# Patient Record
Sex: FEMALE | Race: WHITE | NOT HISPANIC OR LATINO | Employment: OTHER | URBAN - METROPOLITAN AREA
[De-identification: names, ages, dates, MRNs, and addresses within clinical notes are randomized per-mention and may not be internally consistent; named-entity substitution may affect disease eponyms.]

---

## 2018-10-16 ENCOUNTER — IN HOME VISIT (OUTPATIENT)
Dept: GERIATRICS | Age: 83
End: 2018-10-16
Payer: MEDICARE

## 2018-10-16 VITALS
SYSTOLIC BLOOD PRESSURE: 150 MMHG | DIASTOLIC BLOOD PRESSURE: 90 MMHG | HEART RATE: 74 BPM | RESPIRATION RATE: 16 BRPM | TEMPERATURE: 98 F

## 2018-10-16 DIAGNOSIS — E03.9 ACQUIRED HYPOTHYROIDISM: Chronic | ICD-10-CM

## 2018-10-16 DIAGNOSIS — F41.9 ANXIETY: Chronic | ICD-10-CM

## 2018-10-16 DIAGNOSIS — I87.2 CHRONIC VENOUS INSUFFICIENCY: Chronic | ICD-10-CM

## 2018-10-16 DIAGNOSIS — R42 DIZZINESS: Chronic | ICD-10-CM

## 2018-10-16 DIAGNOSIS — K59.09 OTHER CONSTIPATION: Chronic | ICD-10-CM

## 2018-10-16 DIAGNOSIS — K30 INDIGESTION: Chronic | ICD-10-CM

## 2018-10-16 DIAGNOSIS — M19.90 OTHER TYPE OF OSTEOARTHRITIS, UNSPECIFIED SITE: Chronic | ICD-10-CM

## 2018-10-16 DIAGNOSIS — F43.21 SITUATIONAL DEPRESSION: ICD-10-CM

## 2018-10-16 DIAGNOSIS — I10 ESSENTIAL HYPERTENSION: Primary | Chronic | ICD-10-CM

## 2018-10-16 DIAGNOSIS — Z85.3 HISTORY OF MALIGNANT NEOPLASM OF RIGHT BREAST: Chronic | ICD-10-CM

## 2018-10-16 DIAGNOSIS — R74.01 ELEVATED AST (SGOT): Chronic | ICD-10-CM

## 2018-10-16 PROCEDURE — 99326 PR DOMICIL/REST HOME NEW PT HI-MOD SEVER 45 MINUTES: CPT | Performed by: NURSE PRACTITIONER

## 2018-10-16 RX ORDER — ASPIRIN 81 MG/1
81 TABLET, CHEWABLE ORAL DAILY
COMMUNITY
End: 2020-03-16 | Stop reason: HOSPADM

## 2018-10-16 RX ORDER — DIMENHYDRINATE 50 MG/1
25 TABLET ORAL EVERY 6 HOURS PRN
Qty: 30 TABLET | Refills: 3
Start: 2018-10-16 | End: 2020-03-16 | Stop reason: HOSPADM

## 2018-10-16 RX ORDER — CALCIUM CARBONATE 200(500)MG
1 TABLET,CHEWABLE ORAL EVERY 4 HOURS PRN
Qty: 30 TABLET | Refills: 5
Start: 2018-10-16 | End: 2020-03-16 | Stop reason: HOSPADM

## 2018-10-16 RX ORDER — MULTIVITAMIN
1 TABLET ORAL DAILY
COMMUNITY
End: 2018-11-26 | Stop reason: SDUPTHER

## 2018-10-16 RX ORDER — MULTIVIT-MIN/IRON/FOLIC ACID/K 18-600-40
1 CAPSULE ORAL DAILY
COMMUNITY

## 2018-10-16 RX ORDER — ALPRAZOLAM 0.25 MG/1
1 TABLET ORAL DAILY
COMMUNITY
End: 2019-02-11 | Stop reason: SDUPTHER

## 2018-10-16 RX ORDER — LEVOTHYROXINE SODIUM 0.1 MG/1
88 TABLET ORAL DAILY
COMMUNITY

## 2018-10-16 RX ORDER — SENNA AND DOCUSATE SODIUM 50; 8.6 MG/1; MG/1
1 TABLET, FILM COATED ORAL DAILY PRN
Qty: 30 TABLET | Refills: 5
Start: 2018-10-16 | End: 2020-03-16 | Stop reason: HOSPADM

## 2018-10-16 RX ORDER — METOPROLOL TARTRATE 50 MG/1
25 TABLET, FILM COATED ORAL DAILY
COMMUNITY
End: 2020-03-16 | Stop reason: HOSPADM

## 2018-10-16 RX ORDER — ACETAMINOPHEN 500 MG
500 TABLET ORAL EVERY 6 HOURS PRN
Qty: 30 TABLET | Refills: 5
Start: 2018-10-16 | End: 2019-01-08 | Stop reason: SDUPTHER

## 2018-10-16 NOTE — PROGRESS NOTES
Assessment/Plan:    No problem-specific Assessment & Plan notes found for this encounter  Diagnoses and all orders for this visit:    Essential hypertension  Comments:  -metoprolol 50mg QD      Acquired hypothyroidism  Comments:  -levothyroxine 100mcg QD  -Check TSH with reflex T4    Elevated AST (SGOT)  Comments:  -records report this is a mild chronic elevation with normal ALT  -Check CMP    History of malignant neoplasm of right breast  Comments:  -with lumpectomy and radiation approximately 5 years ago  -was getting yrly mammograms but has decided not to have any more mammograms  daughter, Leni Porras, agrees    Chronic venous insufficiency  Comments:  -keeps legs elevated when seated     Anxiety  Comments:  -controlled with laprazolam 0 25mg daily in the morning and a prn dose daily  states she has been taking "for years"    Other type of osteoarthritis, unspecified site  Comments:  -was taking PRN aspirin at recommendation of previous PCP  Will change this to PRN Tylenol  Pt and daughter in agreement  Orders:  -     acetaminophen (TYLENOL) 500 mg tablet; Take 1 tablet (500 mg total) by mouth every 6 (six) hours as needed for mild pain    Dizziness  Comments:  -reports occasional dizziness for which she takes an OTC dramamine  Will order dramamine at pt and daughter request  States it's "been awhile" since dose needed  Orders:  -     dimenhyDRINATE (DRAMAMINE) 50 mg tablet; Take 0 5 tablets (25 mg total) by mouth every 6 (six) hours as needed for movement disorders (dizziness)    Other constipation  Comments:  -daughter requests PRN for this  -Senna S one tab daily PRN  Orders:  -     senna-docusate sodium (SENOKOT-S) 8 6-50 mg per tablet; Take 1 tablet by mouth daily as needed for constipation    Indigestion  Comments:  -daughter requests TUMs for this  -Tums one chew every 4 hours PRN indigestion  Orders:  -     calcium carbonate (TUMS) 500 mg chewable tablet;  Chew 1 tablet (500 mg total) every 4 (four) hours as needed for indigestion or heartburn    Situational depression  Comments:  -related to move to assisted living  -daughter agreeable to hold off on antidepressant at this time  -pt reports no depressive symptoms, is participating social    Other orders  -     ALPRAZolam (XANAX) 0 25 mg tablet; Take 1 tablet by mouth daily And once daily PRN  -     aspirin 81 mg chewable tablet; Chew 81 mg daily  -     Calcium Carb-Cholecalciferol 500-400 MG-UNIT TABS; Take 1 tablet by mouth daily  -     levothyroxine 100 mcg tablet; Take 100 mcg by mouth daily  -     metoprolol tartrate (LOPRESSOR) 50 mg tablet; Take 25 mg by mouth daily  -     Multiple Vitamin (MULTIVITAMIN) tablet; Take 1 tablet by mouth daily          Subjective:      Patient ID: Daniela Downing is a 80 y o  female  80year old female presents for new patient visit  Daughter/Jordon RAPHAEL, present during today's exam   Pt moved to [facility] approximately one week ago  Prior to this she lived at home alone  Daughter states she began assisting with finances approximately one year ago  Daughter states she has become "forgetful"  Pt continued to do her own ADLs  However, for safety reasons, both agree it is time to move to AL facility  Other daughter lives a few blocks down the road  Pt worked as  for [employer] and had 5 children-3 boys, 2 girls  She did not smoke, drink alcohol, no illicit drug use  Had FRANDY 40 years ago  Had a right hip Fx 5 years ago with surgical repair, as a result of a fall  Walks with walker  Has been seeing PT since coming to [facility]  Daughter requesting script for rolling walker with 5 inch wheels  Pt drove up until her hip fx 5 years ago          The following portions of the patient's history were reviewed and updated as appropriate: allergies, current medications, past family history, past medical history, past social history, past surgical history and problem list     Review of Systems   Constitutional: Negative for chills and fever    HENT: Negative for congestion and sore throat  Eyes: Negative for pain  Respiratory: Negative for cough and shortness of breath  Cardiovascular: Positive for palpitations  Negative for chest pain and leg swelling  Occasioanl palpitations during anxious episodes   Gastrointestinal: Negative for abdominal pain, constipation, diarrhea, nausea and vomiting  Genitourinary: Negative for dysuria  Musculoskeletal: Positive for arthralgias  Negative for back pain, joint swelling and myalgias  Skin: Negative for color change  Neurological: Negative for dizziness and headaches  Psychiatric/Behavioral: Negative for behavioral problems and dysphoric mood  The patient is nervous/anxious  Objective:      /90 (BP Location: Right arm, Patient Position: Sitting, Cuff Size: Standard)   Pulse 74   Temp 98 °F (36 7 °C)   Resp 16          Physical Exam   Constitutional: She is oriented to person, place, and time  She is cooperative  No distress  HENT:   Head: Normocephalic and atraumatic  Eyes: Conjunctivae are normal  Right eye exhibits no discharge  Left eye exhibits no discharge  Neck: No JVD present  No tracheal deviation present  No thyromegaly present  Cardiovascular: Normal rate, regular rhythm and normal heart sounds  No murmur heard  Pulmonary/Chest: Effort normal and breath sounds normal  No tachypnea  No respiratory distress  She has no wheezes  She has no rales  She exhibits no tenderness  Abdominal: Soft  Bowel sounds are normal  She exhibits no distension and no mass  There is no tenderness  There is no guarding  Genitourinary:   Genitourinary Comments: No suprapubic tenderness   Musculoskeletal: She exhibits edema  She exhibits no tenderness     1+ pitting edema to B/L ankles and feet, none to legs  Stasis dermatitis to BLEs  Ambulates with roller walker  Mild thoracic scoliosis  Antalgic gait  Upper proximal muscle strength 5/5  Equal hand  strength 5/5  Bunions to B/L medial feet   Lymphadenopathy:     She has no cervical adenopathy  Neurological: She is alert and oriented to person, place, and time  Oriented to place (town) and time (year, month, day)   Skin: Skin is warm and dry  She is not diaphoretic  Psychiatric: She has a normal mood and affect   Her behavior is normal

## 2018-10-29 DIAGNOSIS — E58 CALCIUM DEFICIENCY: Primary | ICD-10-CM

## 2018-10-30 ENCOUNTER — IN HOME VISIT (OUTPATIENT)
Dept: GERIATRICS | Age: 83
End: 2018-10-30
Payer: MEDICARE

## 2018-10-30 VITALS — DIASTOLIC BLOOD PRESSURE: 80 MMHG | HEART RATE: 69 BPM | RESPIRATION RATE: 16 BRPM | SYSTOLIC BLOOD PRESSURE: 150 MMHG

## 2018-10-30 DIAGNOSIS — H11.31 SCLERAL HEMORRHAGE, RIGHT: ICD-10-CM

## 2018-10-30 DIAGNOSIS — F41.9 ANXIETY: Primary | Chronic | ICD-10-CM

## 2018-10-30 DIAGNOSIS — I10 ESSENTIAL HYPERTENSION: Chronic | ICD-10-CM

## 2018-10-30 PROCEDURE — 99335 PR DOM/R-HOME E/M EST PT LW MOD SEVERITY 25 MINUTES: CPT | Performed by: NURSE PRACTITIONER

## 2018-10-30 NOTE — PROGRESS NOTES
Assessment/Plan:    No problem-specific Assessment & Plan notes found for this encounter  Diagnoses and all orders for this visit:    Anxiety  Comments:  -pt denies feeling anxious  -meds reviewed-routine alprazolam 0 25mg daily in the morning and has PRN alprazolam ordered    Scleral hemorrhage, right  Comments:  -report if pain, drainage, or vision loss develop  -f/up next week to re evaluate    Essential hypertension  Comments:  -metoprolol 50mg QD          Subjective:      Patient ID: Alejandro Garner is a 80 y o  female  80year old female being seen for [facility] staff reports of anxiety and physical decline  Pt does have a history of anxiety for which she receives routine alprazolam 0 25mg daily in the AM and also 0 25mg PRN daily  Pt reports feeling no increased anxiety  Upon arrival to room, pt just finishing up with PT [facility] staff, [facility]  Discussed decline with Juanita PT  She states from PT perspective pt is doing quite well  In fact, she is being discharged from PT today because she is doing so well  Pt states she is doing better, physically, than she had been doing before receiving therapy  Pt denies any other concerns  The following portions of the patient's history were reviewed and updated as appropriate: allergies, current medications and problem list     Review of Systems   Constitutional: Negative for chills and fever  HENT: Negative for congestion and sore throat  Eyes: Negative for pain, discharge and visual disturbance  Respiratory: Negative for cough and shortness of breath  Cardiovascular: Negative for chest pain and leg swelling  Gastrointestinal: Negative for abdominal pain, constipation, diarrhea, nausea and vomiting  Genitourinary: Negative for dysuria  Musculoskeletal: Negative for back pain  Skin: Negative for color change  Neurological: Negative for dizziness and headaches  Psychiatric/Behavioral: Negative for behavioral problems   The patient is not nervous/anxious  Objective:      /80 (BP Location: Right arm, Patient Position: Sitting, Cuff Size: Standard)   Pulse 69   Resp 16          Physical Exam   Constitutional: She is cooperative  No distress  HENT:   Head: Normocephalic and atraumatic  Eyes: EOM and lids are normal        Pt denies injury to right eye  Denies pain or discomfort to right eye  Denies vision loss to right eye   Neck: No JVD present  No thyromegaly present  Cardiovascular: Normal rate, regular rhythm and normal heart sounds  Pulmonary/Chest: Effort normal and breath sounds normal  No tachypnea  No respiratory distress  She has no wheezes  She has no rales  She exhibits no tenderness  Musculoskeletal: She exhibits edema  She exhibits no tenderness  Trace pitting edema to B/L feet  Nonpitting edema to b/L ankles   Lymphadenopathy:     She has no cervical adenopathy  Neurological: She is alert  Skin: Skin is warm and dry  She is not diaphoretic  Psychiatric: She has a normal mood and affect   Her behavior is normal

## 2018-11-26 DIAGNOSIS — E63.9 NUTRITION DISORDER: Primary | ICD-10-CM

## 2018-11-26 DIAGNOSIS — E58 CALCIUM DEFICIENCY: ICD-10-CM

## 2018-11-27 RX ORDER — DIPHENOXYLATE HYDROCHLORIDE AND ATROPINE SULFATE 2.5; .025 MG/1; MG/1
1 TABLET ORAL DAILY
Qty: 28 TABLET | Refills: 10 | Status: SHIPPED | OUTPATIENT
Start: 2018-11-27 | End: 2020-03-16 | Stop reason: HOSPADM

## 2019-01-08 DIAGNOSIS — M19.90 OTHER TYPE OF OSTEOARTHRITIS, UNSPECIFIED SITE: Chronic | ICD-10-CM

## 2019-01-10 RX ORDER — ACETAMINOPHEN 500 MG
TABLET ORAL
Qty: 30 TABLET | Refills: 4 | Status: SHIPPED | OUTPATIENT
Start: 2019-01-10 | End: 2020-03-16 | Stop reason: HOSPADM

## 2019-02-05 ENCOUNTER — IN HOME VISIT (OUTPATIENT)
Dept: GERIATRICS | Age: 84
End: 2019-02-05
Payer: MEDICARE

## 2019-02-05 VITALS
DIASTOLIC BLOOD PRESSURE: 82 MMHG | TEMPERATURE: 96.4 F | SYSTOLIC BLOOD PRESSURE: 152 MMHG | HEART RATE: 64 BPM | RESPIRATION RATE: 18 BRPM

## 2019-02-05 DIAGNOSIS — M17.11 OSTEOARTHRITIS OF RIGHT KNEE, UNSPECIFIED OSTEOARTHRITIS TYPE: ICD-10-CM

## 2019-02-05 DIAGNOSIS — I10 ESSENTIAL HYPERTENSION: ICD-10-CM

## 2019-02-05 DIAGNOSIS — F41.9 ANXIETY: Primary | ICD-10-CM

## 2019-02-05 PROCEDURE — 99335 PR DOM/R-HOME E/M EST PT LW MOD SEVERITY 25 MINUTES: CPT | Performed by: NURSE PRACTITIONER

## 2019-02-05 NOTE — PROGRESS NOTES
Assessment/Plan:    No problem-specific Assessment & Plan notes found for this encounter  Diagnoses and all orders for this visit:    Anxiety  Comments:  -no s/s of depression   -continues with alprazolam0 25mg daily in AM and PRN  -attempted to call Liam Garcia at number listed but no answer  Left message     Essential hypertension  Comments:  -BP only slightly elevated today  -continues with metoprolol 50mg daily    Osteoarthritis of right knee, unspecified osteoarthritis type  Comments:  -is working with therapy  -has PRN Tylenol for pain          Subjective:      Patient ID: Travis Walker is a 80 y o  female  80year old female being seen at daughter, Liam Garcia, request because she feels she is depressed  [facility] staff report no change in pt's behaiors or overall  Denies depressive symptoms  Pt denies feeling sad, hopeless, or depressed  Denies decreased appetite or change in sleeping pattern  States "Did my daughter do this?"  Pt reports participating in social activities, including bingo  Also participating in therapy  Spoke with daughter, Liam Garcia, who states mom is doing better than before since she is being encouraged to eat and participate in activities  She is in agreement no med changes at this time and to continue nonpharmacologic interventions of encouraging to participate and eat meals  The following portions of the patient's history were reviewed and updated as appropriate: allergies, current medications and problem list     Review of Systems   Constitutional: Negative for activity change, appetite change and fever  Respiratory: Negative for shortness of breath  Cardiovascular: Negative for chest pain  Musculoskeletal: Positive for arthralgias  Negative for myalgias  Neurological: Negative for headaches  Psychiatric/Behavioral: Negative for agitation, behavioral problems and sleep disturbance  All other systems reviewed and are negative          Objective:      BP 152/82 (BP Location: Right arm, Patient Position: Sitting, Cuff Size: Standard)   Pulse 64   Temp (!) 96 4 °F (35 8 °C)   Resp 18          Physical Exam   Constitutional: She appears well-developed  She is cooperative  No distress  HENT:   Head: Normocephalic and atraumatic  Eyes: Right eye exhibits no discharge  Left eye exhibits no discharge  Neck: No JVD present  No thyromegaly present  Cardiovascular: Normal rate, regular rhythm and normal heart sounds  Pulmonary/Chest: Effort normal and breath sounds normal  No tachypnea  No respiratory distress  She has no wheezes  She has no rales  She exhibits no tenderness  Abdominal: Soft  Bowel sounds are normal  She exhibits no distension  There is no tenderness  Musculoskeletal: She exhibits no edema or tenderness  Ambulates independently with walker   Lymphadenopathy:     She has no cervical adenopathy  Neurological: She is alert  Oriented to place [place]) but not time   Skin: Skin is warm and dry  She is not diaphoretic  Psychiatric: She has a normal mood and affect   Her behavior is normal

## 2019-02-11 DIAGNOSIS — F41.9 ANXIETY: Primary | ICD-10-CM

## 2019-02-13 RX ORDER — ALPRAZOLAM 0.25 MG/1
TABLET ORAL
Qty: 28 TABLET | Refills: 4 | Status: SHIPPED | OUTPATIENT
Start: 2019-02-13 | End: 2020-03-16 | Stop reason: HOSPADM

## 2019-10-31 ENCOUNTER — HOSPITAL ENCOUNTER (EMERGENCY)
Facility: HOSPITAL | Age: 84
Discharge: NURSING, REHAB OR LONG-TERM CARE | End: 2019-10-31
Admitting: EMERGENCY MEDICINE
Payer: MEDICARE

## 2019-10-31 ENCOUNTER — APPOINTMENT (EMERGENCY)
Dept: RADIOLOGY | Facility: HOSPITAL | Age: 84
End: 2019-10-31
Payer: MEDICARE

## 2019-10-31 VITALS
RESPIRATION RATE: 16 BRPM | DIASTOLIC BLOOD PRESSURE: 88 MMHG | HEART RATE: 66 BPM | WEIGHT: 147.93 LBS | SYSTOLIC BLOOD PRESSURE: 197 MMHG | OXYGEN SATURATION: 95 % | TEMPERATURE: 97.6 F
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
## 2019-10-31 PROBLEM — S81.811A SKIN TEAR OF LOWER LEG WITHOUT COMPLICATION, RIGHT, INITIAL ENCOUNTER: Status: ACTIVE | Noted: 2019-10-31

## 2019-10-31 LAB
BASE EXCESS BLDA CALC-SCNC: 3 MMOL/L (ref -2–3)
CA-I BLD-SCNC: 1.22 MMOL/L (ref 1.12–1.32)
GLUCOSE SERPL-MCNC: 88 MG/DL (ref 65–140)
HCO3 BLDA-SCNC: 27.5 MMOL/L (ref 24–30)
HCT VFR BLD CALC: 38 % (ref 34.8–46.1)
HGB BLDA-MCNC: 12.9 G/DL (ref 11.5–15.4)
HOLD SPECIMEN: NORMAL
PCO2 BLD: 29 MMOL/L (ref 21–32)
PCO2 BLD: 41.1 MM HG (ref 42–50)
PH BLD: 7.43 [PH] (ref 7.3–7.4)
PO2 BLD: 34 MM HG (ref 35–45)
POTASSIUM BLD-SCNC: 3.5 MMOL/L (ref 3.5–5.3)
SAO2 % BLD FROM PO2: 66 % (ref 60–85)
SODIUM BLD-SCNC: 142 MMOL/L (ref 136–145)
SPECIMEN SOURCE: ABNORMAL

## 2019-10-31 PROCEDURE — 99285 EMERGENCY DEPT VISIT HI MDM: CPT

## 2019-10-31 PROCEDURE — 99282 EMERGENCY DEPT VISIT SF MDM: CPT | Performed by: SURGERY

## 2019-10-31 PROCEDURE — 90715 TDAP VACCINE 7 YRS/> IM: CPT | Performed by: EMERGENCY MEDICINE

## 2019-10-31 PROCEDURE — 84295 ASSAY OF SERUM SODIUM: CPT

## 2019-10-31 PROCEDURE — 72125 CT NECK SPINE W/O DYE: CPT

## 2019-10-31 PROCEDURE — 82803 BLOOD GASES ANY COMBINATION: CPT

## 2019-10-31 PROCEDURE — 82330 ASSAY OF CALCIUM: CPT

## 2019-10-31 PROCEDURE — 70450 CT HEAD/BRAIN W/O DYE: CPT

## 2019-10-31 PROCEDURE — 82947 ASSAY GLUCOSE BLOOD QUANT: CPT

## 2019-10-31 PROCEDURE — 90471 IMMUNIZATION ADMIN: CPT

## 2019-10-31 PROCEDURE — NC001 PR NO CHARGE: Performed by: EMERGENCY MEDICINE

## 2019-10-31 PROCEDURE — 36415 COLL VENOUS BLD VENIPUNCTURE: CPT | Performed by: EMERGENCY MEDICINE

## 2019-10-31 PROCEDURE — 85014 HEMATOCRIT: CPT

## 2019-10-31 PROCEDURE — 84132 ASSAY OF SERUM POTASSIUM: CPT

## 2019-10-31 PROCEDURE — 71045 X-RAY EXAM CHEST 1 VIEW: CPT

## 2019-10-31 RX ORDER — LACTOSE-REDUCED FOOD
1 LIQUID (ML) ORAL DAILY
COMMUNITY

## 2019-10-31 RX ORDER — METOPROLOL TARTRATE 50 MG/1
50 TABLET, FILM COATED ORAL DAILY
COMMUNITY

## 2019-10-31 RX ORDER — ALPRAZOLAM 0.25 MG/1
0.25 TABLET ORAL DAILY PRN
COMMUNITY
End: 2020-03-16 | Stop reason: HOSPADM

## 2019-10-31 RX ORDER — ACETAMINOPHEN 500 MG
500 TABLET ORAL 3 TIMES DAILY
COMMUNITY

## 2019-10-31 RX ORDER — DIPHENOXYLATE HYDROCHLORIDE AND ATROPINE SULFATE 2.5; .025 MG/1; MG/1
1 TABLET ORAL DAILY
COMMUNITY
End: 2020-03-16 | Stop reason: HOSPADM

## 2019-10-31 RX ORDER — ASPIRIN 81 MG/1
81 TABLET, CHEWABLE ORAL DAILY
COMMUNITY
End: 2020-03-16 | Stop reason: HOSPADM

## 2019-10-31 RX ORDER — LEVOTHYROXINE SODIUM 0.1 MG/1
137 TABLET ORAL DAILY
COMMUNITY
End: 2020-03-16 | Stop reason: HOSPADM

## 2019-10-31 RX ORDER — CALCIUM CARBONATE 200(500)MG
1 TABLET,CHEWABLE ORAL EVERY 4 HOURS PRN
COMMUNITY
End: 2020-03-16 | Stop reason: HOSPADM

## 2019-10-31 RX ORDER — ACETAMINOPHEN 500 MG
500 TABLET ORAL EVERY 6 HOURS PRN
COMMUNITY

## 2019-10-31 RX ORDER — SERTRALINE HYDROCHLORIDE 100 MG/1
50 TABLET, FILM COATED ORAL DAILY
COMMUNITY
End: 2020-03-16 | Stop reason: HOSPADM

## 2019-10-31 RX ORDER — ALPRAZOLAM 0.25 MG/1
0.25 TABLET ORAL DAILY
Status: ON HOLD | COMMUNITY
End: 2020-03-16 | Stop reason: SDUPTHER

## 2019-10-31 RX ADMIN — TETANUS TOXOID, REDUCED DIPHTHERIA TOXOID AND ACELLULAR PERTUSSIS VACCINE, ADSORBED 0.5 ML: 5; 2.5; 8; 8; 2.5 SUSPENSION INTRAMUSCULAR at 05:22

## 2019-10-31 NOTE — DISCHARGE INSTRUCTIONS
Fall Prevention for Older Adults   WHAT YOU NEED TO KNOW:   As you age, your muscles weaken and your risk for falls increases  Your risk also increases if you take medicines that make you sleepy or dizzy  You may also be at risk if you have vision or joint problems, have low blood pressure, or are not active  DISCHARGE INSTRUCTIONS:   Call 911 or have someone else call if:   · You have fallen and are unconscious  · You have fallen and cannot move part of your body  Contact your healthcare provider if:   · You have fallen and have pain or a headache  · You have questions or concerns about your condition or care  Fall prevention tips:   · Stay active  Exercise can help strengthen your muscles and improve your balance  Your healthcare provider may recommend water aerobics, walking, or Rodrick Chi  He may also recommend physical therapy to improve your coordination  Never start an exercise program without asking your healthcare provider first     · Wear shoes that fit well and have soles that   Wear shoes both inside and outside  Use slippers with good   Avoid shoes with high heels  · Use assistive devices as directed  Your healthcare provider may suggest that you use a cane or walker to help you keep your balance  You may need to have grab bars put in your bathroom near the toilet or in the shower  · Stand or sit up slowly  This may help you keep your balance and prevent falls  · Wear a personal alarm  This is a device that allows you to call 911 if you need help  Ask for more information on personal alarms  · Manage your medical conditions  Keep all appointments with your healthcare providers  Visit your eye doctor as directed  Home safety tips:   · Add items to prevent falls in the bathroom  Put nonslip strips on your bath or shower floor to prevent you from slipping  Use a bath mat if you do not have carpet in the bathroom   This will prevent you from falling when you step out of the bath or shower  Use a shower seat so you do not need to stand while you shower  Sit on the toilet or a chair in your bathroom to dry yourself and put on clothing  This will prevent you from losing your balance from drying or dressing yourself while you are standing  · Keep paths clear  Remove books, shoes, and other objects from walkways and stairs  Place cords for telephones and lamps out of the way so that you do not need to walk over them  Tape them down if you cannot move them  Remove small rugs  If you cannot remove a rug, secure it with double-sided tape  This will prevent you from tripping  · Install bright lights in your home  Use night lights to help light paths to the bathroom or kitchen  Always turn on the light before you start walking  · Keep items you use often on shelves within reach  Do not use a step stool to help you reach an item  · Paint or place reflective tape on the edges of your stairs  This will help you see the stairs better  Follow up with your healthcare provider as directed:  Write down your questions so you remember to ask them during your visits  ©  [address] [employer] [employer] Information is for End User's use only and may not be sold, redistributed or otherwise used for commercial purposes  All illustrations and images included in CareNotes® are the copyrighted property of [employer]  or [address]  The above information is an  only  It is not intended as medical advice for individual conditions or treatments  Talk to your doctor, nurse or pharmacist before following any medical regimen to see if it is safe and effective for you  Skin Tear   WHAT YOU NEED TO KNOW:   A skin tear occurs when the layers of weakened skin split open from an injury  , elderly, and chronically or critically ill people are at higher risk for skin tears  Long-term use of steroids can also increase the risk   It is important to treat and prevent skin tears to prevent infection  DISCHARGE INSTRUCTIONS:   Medicines:   · Medicines  may be given to reduce your pain or to treat or prevent an infection  Do not wait until the pain is severe before you ask for more medicine  · Take your medicine as directed  Contact your healthcare provider if you think your medicine is not helping or if you have side effects  Tell him of her if you are allergic to any medicine  Keep a list of the medicines, vitamins, and herbs you take  Include the amounts, and when and why you take them  Bring the list or the pill bottles to follow-up visits  Carry your medicine list with you in case of an emergency  Follow up with your healthcare provider as directed:  Write down your questions so you remember to ask them during your visits  Wound care: You may be referred to a wound specialist who will teach you how to clean your wound properly  Ask for more information about wound care  Prevent another skin tear:   · Clean, moisturize, and protect your skin  Baths, hot showers, and soap can dry your skin and increase your risk for skin tears  Take tepid showers, use mild soap as directed, and gently pat your skin dry  Use lotion to keep your skin moist after you shower  Wear long sleeves, pants, and protective footwear  · Move carefully  Ask for help if you cannot lift yourself well enough to avoid dragging your skin when you move  · Keep your home safe  Cover sharp corners, keep your pathways clear, and turn on lights so you can see clearly  Ask for more information if you have questions about home safety  · Drink liquids as directed  Ask your healthcare provider how much liquid to drink each day and which liquids are best for you  Liquids will help keep your skin moist and protected from future skin tears  · Eat high-protein foods  Examples are lean meats, fish, low-fat dairy products, and beans   A dietitian may help you create high-protein meal plans to help with wound healing  Contact your healthcare provider if:   · You have redness, swelling, pus, or a bad odor coming from your wound  · Blood soaks through your bandage  · You have increased pain, even after you take pain medicine  · Your wound tears open again  Return to the emergency department if:   · You have a fever  © 2017 [address] Information is for End User's use only and may not be sold, redistributed or otherwise used for commercial purposes  All illustrations and images included in CareNotes® are the copyrighted property of [employer]  or [address]  The above information is an  only  It is not intended as medical advice for individual conditions or treatments  Talk to your doctor, nurse or pharmacist before following any medical regimen to see if it is safe and effective for you

## 2019-10-31 NOTE — PROGRESS NOTES
Pastoral Care Progress Note    10/31/2019  Patient: Callie Garner : 1930  Admission Date & Time: 10/31/2019 0457  MRN: [id number] CSN: [id number]                     Chaplaincy Interventions Utilized:   Empowerment: Normalized experience of patient/family    Exploration: Explored emotional needs & resources        Relationship Building: Listened empathically            Chaplaincy Outcomes Achieved:  Debriefed/defused experience    Spoke with family on the phone  They are on their way

## 2019-10-31 NOTE — ED PROVIDER NOTES
Emergency Department Airway Evaluation and Management Form    History  Obtained from: EMS  [place]  Chief Complaint   Patient presents with    Fall     Patient presents to ED after unwittnessed fall at [facility] secured dementia unit  Patient found lying on floor by staff  Laceration to right calf, bleeding controlled with bandage  HPI    Past Medical History:   Diagnosis Date    Depression     Hypertension     Hypothyroidism     Malignant neoplasm of right breast ([address] )      History reviewed  No pertinent surgical history  History reviewed  No pertinent family history  Social History     Tobacco Use    Smoking status: Unknown If Ever Smoked   Substance Use Topics    Alcohol use: Not Currently    Drug use: Never     I have reviewed and agree with the history as documented  Review of Systems    Physical Exam  BP (!) 200/96   Pulse 68   Temp 97 6 °F (36 4 °C) (Tympanic)   Resp 16   Wt 67 1 kg (147 lb 14 9 oz)   SpO2 94%     Physical Exam    ED Medications  Medications   tetanus-diphtheria-acellular pertussis (BOOSTRIX) IM injection 0 5 mL (has no administration in time range)       Intubation  Procedures    Notes  Airway intact  No acute intervention necessary at this time      Final Diagnosis  Final diagnoses:   None       ED Provider  Electronically Signed by     Marly Mayers DO  10/31/19 5743

## 2019-10-31 NOTE — H&P
H&P Exam - Trauma   Callie Garner 80 y o  female MRN: [id number]  Unit/Bed#: ED 03 Encounter: [id number]    Assessment/Plan   Trauma Alert: Level B  Model of Arrival: Ambulance  Trauma Team: Attending Shauna Olguin, , Residents Ernesto Guerrero and Fellow Marc  Consultants: None    Trauma Active Problems: Fall on ASA    Trauma Plan: CT head/C-spine     Chief Complaint: left leg pain    History of Present Illness   HPI:  Callie Garner is a 80 y o  female with a past medical history of dementia, hypertension, on 81 mg aspirin daily who presents to trauma bay after unwitnessed fall at her locked dementia unit at [facility]  Patient is complaining of pain over a right lower extremity skin tear  She is not able to relate the details of the event  Mechanism:Fall    Review of Systems   Unable to perform ROS: Dementia       Historical Information   History is unobtainable from the patient due to dementia  Efforts to obtain history included the following sources: other medical personnel    Past Medical History:   Diagnosis Date    Depression     Hypertension     Hypothyroidism     Malignant neoplasm of right breast ([address] )      History reviewed  No pertinent surgical history    Social History   Social History     Substance and Sexual Activity   Alcohol Use Not Currently     Social History     Substance and Sexual Activity   Drug Use Never     Social History     Tobacco Use   Smoking Status Unknown If Ever Smoked     Immunization History   Administered Date(s) Administered    Tdap 10/31/2019     Last Tetanus: 1990  Family History: Non-contributory  Unable to obtain/limited by dementia      Meds/Allergies   all current active meds have been reviewed    Allergies   Allergen Reactions    Sulindac          PHYSICAL EXAM      Objective   Vitals:   First set: Temperature: 97 6 °F (36 4 °C) (10/31/19 0500)  Pulse: 71 (10/31/19 0500)  Respirations: 16 (10/31/19 0500)  Blood Pressure: (!) 211/111 (10/31/19 0500)    Primary Survey:   (A) Airway: intact  (B) Breathing: bilateral breath sounds  (C) Circulation: Pulses:   carotid  2/4, pedal  2/4 and femoral  2/4  (D) Disabliity:  GCS Total:  15  (E) Expose:  Completed    Secondary Survey:  Physical Exam   Constitutional: She is oriented to person, place, and time  She appears well-developed and well-nourished  HENT:   Head: Normocephalic and atraumatic  Right Ear: Tympanic membrane normal  No hemotympanum  Left Ear: Tympanic membrane normal  No hemotympanum  Nose: Nose normal  No nasal septal hematoma  Mouth/Throat: Uvula is midline and oropharynx is clear and moist    Eyes: Pupils are equal, round, and reactive to light  Conjunctivae and EOM are normal    Neck: Phonation normal  No spinous process tenderness present  Cardiovascular: Normal rate, regular rhythm, normal heart sounds and intact distal pulses  No murmur heard  Pulses:       Carotid pulses are 2+ on the right side, and 2+ on the left side  Femoral pulses are 2+ on the right side, and 2+ on the left side  Dorsalis pedis pulses are 2+ on the right side, and 2+ on the left side  Pulmonary/Chest: Effort normal and breath sounds normal  She exhibits no tenderness and no crepitus  Abdominal: Soft  Bowel sounds are normal  There is no tenderness  There is no rigidity  Musculoskeletal: Normal range of motion  Right shoulder: Normal         Left shoulder: Normal         Right elbow: Normal        Left elbow: Normal         Right wrist: Normal         Left wrist: Normal         Right hip: Normal         Left hip: Normal         Right knee: Normal         Left knee: Normal         Right ankle: Normal         Left ankle: Normal         Cervical back: Normal         Thoracic back: Normal         Lumbar back: Normal    Neurological: She is alert and oriented to person, place, and time  She has normal strength  No cranial nerve deficit or sensory deficit  GCS eye subscore is 4   GCS verbal subscore is 5  GCS motor subscore is 6  Patient is alert and oriented to person, place, and day  Speech is normal with no dysarthria, no aphasia  Cranial nerves II-XII intact  Sensation is intact bilaterally upper and lower extremities  Normal muscle tone, no clonus, no atrophy  5/5 muscle strength bilaterally upper extremities, 5/5 muscle strength bilaterally lower extremities  Resting tremor all four extremities    Skin: Skin is warm and dry  She is not diaphoretic  Psychiatric: She has a normal mood and affect  Her behavior is normal    Vitals reviewed  Invasive Devices     None                 Lab Results: Results: I have personally reviewed pertinent reports  Imaging/EKG Studies: Results: I have personally reviewed pertinent reports      Other Studies:     Code Status: No Order  Advance Directive and Living Will:      Power of :    POLST:

## 2019-11-16 ENCOUNTER — APPOINTMENT (EMERGENCY)
Dept: RADIOLOGY | Facility: HOSPITAL | Age: 84
End: 2019-11-16
Payer: MEDICARE

## 2019-11-16 ENCOUNTER — HOSPITAL ENCOUNTER (EMERGENCY)
Facility: HOSPITAL | Age: 84
Discharge: HOME | End: 2019-11-16
Attending: EMERGENCY MEDICINE
Payer: MEDICARE

## 2019-11-16 ENCOUNTER — APPOINTMENT (EMERGENCY)
Dept: NON INVASIVE DIAGNOSTICS | Facility: HOSPITAL | Age: 84
End: 2019-11-16
Payer: MEDICARE

## 2019-11-16 VITALS
WEIGHT: 143.74 LBS | SYSTOLIC BLOOD PRESSURE: 194 MMHG | BODY MASS INDEX: 22.56 KG/M2 | HEART RATE: 67 BPM | TEMPERATURE: 98 F | OXYGEN SATURATION: 97 % | HEIGHT: 67 IN | DIASTOLIC BLOOD PRESSURE: 99 MMHG | RESPIRATION RATE: 20 BRPM

## 2019-11-16 DIAGNOSIS — L03.90 CELLULITIS: Primary | ICD-10-CM

## 2019-11-16 LAB
ALBUMIN SERPL BCP-MCNC: 3.8 G/DL (ref 3.5–5)
ALP SERPL-CCNC: 86 U/L (ref 46–116)
ALT SERPL W P-5'-P-CCNC: 27 U/L (ref 12–78)
ANION GAP SERPL CALCULATED.3IONS-SCNC: 9 MMOL/L (ref 4–13)
AST SERPL W P-5'-P-CCNC: 50 U/L (ref 5–45)
BASOPHILS # BLD AUTO: 0.03 THOUSANDS/ΜL (ref 0–0.1)
BASOPHILS NFR BLD AUTO: 0 % (ref 0–1)
BILIRUB SERPL-MCNC: 0.33 MG/DL (ref 0.2–1)
BUN SERPL-MCNC: 18 MG/DL (ref 5–25)
CALCIUM SERPL-MCNC: 9.2 MG/DL (ref 8.3–10.1)
CHLORIDE SERPL-SCNC: 106 MMOL/L (ref 100–108)
CO2 SERPL-SCNC: 28 MMOL/L (ref 21–32)
CREAT SERPL-MCNC: 0.67 MG/DL (ref 0.6–1.3)
EOSINOPHIL # BLD AUTO: 0.18 THOUSAND/ΜL (ref 0–0.61)
EOSINOPHIL NFR BLD AUTO: 3 % (ref 0–6)
ERYTHROCYTE [DISTWIDTH] IN BLOOD BY AUTOMATED COUNT: 12.2 % (ref 11.6–15.1)
GFR SERPL CREATININE-BSD FRML MDRD: 78 ML/MIN/1.73SQ M
GLUCOSE SERPL-MCNC: 91 MG/DL (ref 65–140)
HCT VFR BLD AUTO: 40.5 % (ref 34.8–46.1)
HGB BLD-MCNC: 13.3 G/DL (ref 11.5–15.4)
IMM GRANULOCYTES # BLD AUTO: 0.02 THOUSAND/UL (ref 0–0.2)
IMM GRANULOCYTES NFR BLD AUTO: 0 % (ref 0–2)
LYMPHOCYTES # BLD AUTO: 1.57 THOUSANDS/ΜL (ref 0.6–4.47)
LYMPHOCYTES NFR BLD AUTO: 22 % (ref 14–44)
MCH RBC QN AUTO: 32.4 PG (ref 26.8–34.3)
MCHC RBC AUTO-ENTMCNC: 32.8 G/DL (ref 31.4–37.4)
MCV RBC AUTO: 99 FL (ref 82–98)
MONOCYTES # BLD AUTO: 0.67 THOUSAND/ΜL (ref 0.17–1.22)
MONOCYTES NFR BLD AUTO: 10 % (ref 4–12)
NEUTROPHILS # BLD AUTO: 4.6 THOUSANDS/ΜL (ref 1.85–7.62)
NEUTS SEG NFR BLD AUTO: 65 % (ref 43–75)
NRBC BLD AUTO-RTO: 0 /100 WBCS
PLATELET # BLD AUTO: 234 THOUSANDS/UL (ref 149–390)
PMV BLD AUTO: 9.7 FL (ref 8.9–12.7)
POTASSIUM SERPL-SCNC: 3.7 MMOL/L (ref 3.5–5.3)
PROT SERPL-MCNC: 7.1 G/DL (ref 6.4–8.2)
RBC # BLD AUTO: 4.1 MILLION/UL (ref 3.81–5.12)
SODIUM SERPL-SCNC: 143 MMOL/L (ref 136–145)
WBC # BLD AUTO: 7.07 THOUSAND/UL (ref 4.31–10.16)

## 2019-11-16 PROCEDURE — 93971 EXTREMITY STUDY: CPT | Performed by: SURGERY

## 2019-11-16 PROCEDURE — 80053 COMPREHEN METABOLIC PANEL: CPT | Performed by: EMERGENCY MEDICINE

## 2019-11-16 PROCEDURE — 99284 EMERGENCY DEPT VISIT MOD MDM: CPT | Performed by: EMERGENCY MEDICINE

## 2019-11-16 PROCEDURE — 99284 EMERGENCY DEPT VISIT MOD MDM: CPT

## 2019-11-16 PROCEDURE — 93971 EXTREMITY STUDY: CPT

## 2019-11-16 PROCEDURE — 93005 ELECTROCARDIOGRAM TRACING: CPT

## 2019-11-16 PROCEDURE — 85025 COMPLETE CBC W/AUTO DIFF WBC: CPT | Performed by: EMERGENCY MEDICINE

## 2019-11-16 PROCEDURE — 36415 COLL VENOUS BLD VENIPUNCTURE: CPT | Performed by: EMERGENCY MEDICINE

## 2019-11-16 PROCEDURE — 73630 X-RAY EXAM OF FOOT: CPT

## 2019-11-16 RX ORDER — DOXYCYCLINE HYCLATE 100 MG/1
100 CAPSULE ORAL 2 TIMES DAILY
Qty: 14 CAPSULE | Refills: 0 | Status: SHIPPED | OUTPATIENT
Start: 2019-11-16 | End: 2019-11-23

## 2019-11-16 RX ORDER — DOXYCYCLINE HYCLATE 100 MG/1
100 CAPSULE ORAL ONCE
Status: COMPLETED | OUTPATIENT
Start: 2019-11-16 | End: 2019-11-16

## 2019-11-16 RX ADMIN — DOXYCYCLINE 100 MG: 100 CAPSULE ORAL at 13:42

## 2019-11-16 NOTE — ED NOTES
Pt  Transported to [facility]  Report called [facility]        Kina Moreno RN  11/16/19 9052

## 2019-11-16 NOTE — ED PROVIDER NOTES
History  Chief Complaint   Patient presents with    Leg Swelling     Pt  arrives from [facility] with right lower extremity edema since this morning  Patient is a 77-year-old woman presents for [facility] dementia unit with complaints of right lower extremity swelling  Approximately 2 weeks ago, the patient had a fall and suffered abrasion to the right calf  She presented to this emergency department with a negative workup and was discharged  Nursing staff noted this morning that the patient had right lower leg erythema, swelling, tenderness, pain, warmth  Patient is not currently complaining of any pain at this time  She has not taken anything for the pain  No known trauma to the area  No known prior history of DVT/PE  Patient is on 81 mg aspirin but no anticoagulants  No known history of fevers, chills, rigors  She has been eating and drinking normally over the past several days  No history of nausea, vomiting  She denies any chest pain, dyspnea, abdominal pain  She has been moving her bowels and urinating normally  Patient is primarily wheelchair bound and does not walk  Prior to Admission Medications   Prescriptions Last Dose Informant Patient Reported? Taking? ALPRAZolam (XANAX) 0 25 mg tablet 11/16/2019 at Unknown time  No Yes   Sig: TAKE 1 TABLET BY MOUTH DAILY  ALPRAZolam (XANAX) 0 25 mg tablet   Yes No   Sig: Take 0 25 mg by mouth daily   ALPRAZolam (XANAX) 0 25 mg tablet   Yes No   Sig: Take 0 25 mg by mouth daily as needed for anxiety   Calcium Carb-Cholecalciferol 500-400 MG-UNIT TABS 11/16/2019 at Unknown time  Yes Yes   Sig: Take 1 tablet by mouth daily   Calcium Carbonate-Vitamin D 600-400 MG-UNIT per tablet   No No   Sig: TAKE 1 TABLET BY MOUTH ONCE DAILY     Calcium-Vitamin D ([employer] CALCIUM-600/VIT D PO)   Yes No   Sig: Take 1 tablet by mouth daily   MAPAP 500 MG tablet Past Week at Unknown time  No Yes   Sig: TAKE 1 TABLET BY MOUTH EVERY 6 HOURS AS NEEDED Meclizine HCl (MEDI-MECLIZINE PO) Past Week at Unknown time  Yes Yes   Sig: Take 25 mg by mouth every 6 (six) hours as needed   Menthol, Topical Analgesic, (BIOFREEZE) 4 % GEL Past Week at Unknown time  Yes Yes   Sig: Apply topically daily   Nutritional Supplements (ENSURE) 11/15/2019 at Unknown time  Yes Yes   Sig: Take 1 Can by mouth daily   Sennosides-Docusate Sodium (SENNALAX-S PO)   Yes No   Sig: Take 1 tablet by mouth daily as needed   acetaminophen (TYLENOL) 500 mg tablet Past Week at Unknown time  Yes Yes   Sig: Take 500 mg by mouth 3 (three) times a day   acetaminophen (TYLENOL) 500 mg tablet Past Week at Unknown time  Yes Yes   Sig: Take 500 mg by mouth every 6 (six) hours as needed for mild pain   aspirin 81 mg chewable tablet 11/16/2019 at Unknown time  Yes Yes   Sig: Chew 81 mg daily   aspirin 81 mg chewable tablet   Yes No   Sig: Chew 81 mg daily   calcium carbonate (TUMS) 500 mg chewable tablet   No No   Sig: Chew 1 tablet (500 mg total) every 4 (four) hours as needed for indigestion or heartburn   calcium carbonate (TUMS) 500 mg chewable tablet Past Week at Unknown time  Yes Yes   Sig: Chew 1 tablet every 4 (four) hours as needed for indigestion or heartburn   dimenhyDRINATE (DRAMAMINE) 50 mg tablet   No No   Sig: Take 0 5 tablets (25 mg total) by mouth every 6 (six) hours as needed for movement disorders (dizziness)   levothyroxine 100 mcg tablet 11/16/2019 at Unknown time  Yes Yes   Sig: Take 137 mcg by mouth daily    levothyroxine 100 mcg tablet   Yes No   Sig: Take 137 mcg by mouth daily   metoprolol tartrate (LOPRESSOR) 50 mg tablet 11/16/2019 at Unknown time  Yes Yes   Sig: Take 25 mg by mouth daily   metoprolol tartrate (LOPRESSOR) 50 mg tablet   Yes No   Sig: Take 50 mg by mouth daily   multivitamin (THERAGRAN) TABS 11/16/2019 at Unknown time  No Yes   Sig: TAKE 1 TABLET BY MOUTH DAILY     multivitamin (THERAGRAN) TABS   Yes No   Sig: Take 1 tablet by mouth daily   senna-docusate sodium (SENOKOT-S) 8 6-50 mg per tablet Past Week at Unknown time  No Yes   Sig: Take 1 tablet by mouth daily as needed for constipation   sertraline (ZOLOFT) 100 mg tablet 11/15/2019 at Unknown time  Yes Yes   Sig: Take 50 mg by mouth daily      Facility-Administered Medications: None       Past Medical History:   Diagnosis Date    Anxiety     Benign positional vertigo     Breast cancer, right (HCC)     Dr Kody CHERRY    Chronic venous insufficiency     Depression     [facility]    HTN (hypertension), benign     Hypertension     Hypothyroidism     Left knee DJD     Mal[facility] ne[facility] of right [facility] )     Nontoxic uninodular goiter     Right knee DJD        Past Surgical History:   Procedure Laterality Date    INTERTROCHANTERIC HIP FRACTURE SURGERY  2013       History reviewed  No pertinent family history  I have reviewed and agree with the history as documented  Social History     Tobacco Use    Smoking status: Never Smoker    Smokeless tobacco: Never Used   Substance Use Topics    Alcohol use: Not Currently    Drug use: Never        Review of Systems   Constitutional: Negative for chills, diaphoresis, fatigue and fever  HENT: Negative for facial swelling, sore throat and trouble swallowing  Respiratory: Negative for cough, chest tightness, shortness of breath and wheezing  Cardiovascular: Positive for leg swelling  Negative for chest pain  Gastrointestinal: Negative for abdominal distention, abdominal pain, diarrhea, nausea and vomiting  Genitourinary: Negative for dysuria  Musculoskeletal: Negative for back pain, neck pain and neck stiffness  Skin: Negative for color change, pallor, rash and wound  Neurological: Negative for weakness, light-headedness and numbness  Psychiatric/Behavioral: Negative for agitation  All other systems reviewed and are negative        Physical Exam  ED Triage Vitals   Temperature Pulse Respirations Blood Pressure SpO2   11/16/19 1117 11/16/19 1117 11/16/19 1113 11/16/19 1117 11/16/19 1117   98 °F (36 7 °C) 72 18 (!) 197/100 95 %      Temp Source Heart Rate Source Patient Position - Orthostatic VS BP Location FiO2 (%)   11/16/19 1113 11/16/19 1113 11/16/19 1113 11/16/19 1113 --   Oral Monitor Lying Right arm       Pain Score       11/16/19 1113       No Pain             Orthostatic Vital Signs  Vitals:    11/16/19 1232 11/16/19 1245 11/16/19 1300 11/16/19 1348   BP: (!) 171/91 (!) 171/91 (!) 171/94 (!) 194/99   Pulse: 67 64 62 67   Patient Position - Orthostatic VS: Lying Lying Lying Lying       Physical Exam   Constitutional: She is oriented to person, place, and time  She appears well-developed and well-nourished  No distress  HENT:   Head: Normocephalic  Eyes: Pupils are equal, round, and reactive to light  Neck: Normal range of motion  Neck supple  Cardiovascular: Normal rate, regular rhythm, normal heart sounds and intact distal pulses  Pulmonary/Chest: Effort normal and breath sounds normal    Abdominal: Soft  Bowel sounds are normal  She exhibits no distension  There is no tenderness  There is no guarding  Musculoskeletal: Normal range of motion  She exhibits edema and tenderness  She exhibits no deformity  Right lower extremity:  Patient with erythema/tenderness/swelling/warmth over the right forefoot and right lower calf  No bony tenderness of the ankle, knee, hip noted  Full range of motion of these joints  Neurological: She is alert and oriented to person, place, and time  No cranial nerve deficit or sensory deficit  Skin: Skin is warm and dry  Capillary refill takes less than 2 seconds  Psychiatric: She has a normal mood and affect  Her behavior is normal  Judgment and thought content normal    Vitals reviewed        ED Medications  Medications   doxycycline hyclate (VIBRAMYCIN) capsule 100 mg (100 mg Oral Given 11/16/19 1342)       Diagnostic Studies  Results Reviewed     Procedure Component Value Units Date/Time    Comprehensive metabolic panel [[id number]]  (Abnormal) Collected:  11/16/19 1219    Lab Status:  Final result Specimen:  Blood from Arm, Left Updated:  11/16/19 1300     Sodium 143 mmol/L      Potassium 3 7 mmol/L      Chloride 106 mmol/L      CO2 28 mmol/L      ANION GAP 9 mmol/L      BUN 18 mg/dL      Creatinine 0 67 mg/dL      Glucose 91 mg/dL      Calcium 9 2 mg/dL      AST 50 U/L      ALT 27 U/L      Alkaline Phosphatase 86 U/L      Total Protein 7 1 g/dL      Albumin 3 8 g/dL      Total Bilirubin 0 33 mg/dL      eGFR 78 ml/min/1 73sq m     Narrative:       [place] guidelines for Chronic Kidney Disease (CKD):     Stage 1 with normal or high GFR (GFR > 90 mL/min/1 73 square meters)    Stage 2 Mild CKD (GFR = 60-89 mL/min/1 73 square meters)    Stage 3A Moderate CKD (GFR = 45-59 mL/min/1 73 square meters)    Stage 3B Moderate CKD (GFR = 30-44 mL/min/1 73 square meters)    Stage 4 Severe CKD (GFR = 15-29 mL/min/1 73 square meters)    Stage 5 End Stage CKD (GFR <15 mL/min/1 73 square meters)  Note: GFR calculation is accurate only with a steady state creatinine    CBC and differential [[id number]]  (Abnormal) Collected:  11/16/19 1219    Lab Status:  Final result Specimen:  Blood from Arm, Left Updated:  11/16/19 1231     WBC 7 07 Thousand/uL      RBC 4 10 Million/uL      Hemoglobin 13 3 g/dL      Hematocrit 40 5 %      MCV 99 fL      MCH 32 4 pg      MCHC 32 8 g/dL      RDW 12 2 %      MPV 9 7 fL      Platelets 769 Thousands/uL      nRBC 0 /100 WBCs      Neutrophils Relative 65 %      Immat GRANS % 0 %      Lymphocytes Relative 22 %      Monocytes Relative 10 %      Eosinophils Relative 3 %      Basophils Relative 0 %      Neutrophils Absolute 4 60 Thousands/µL      Immature Grans Absolute 0 02 Thousand/uL      Lymphocytes Absolute 1 57 Thousands/µL      Monocytes Absolute 0 67 Thousand/µL      Eosinophils Absolute 0 18 Thousand/µL      Basophils Absolute 0 03 Thousands/µL                  XR foot 3+ views RIGHT   ED Interpretation by Isadora Nice MD (11/16 9343)   No fracture  VAS lower limb venous duplex study, unilateral/limited    (Results Pending)         Procedures  Procedures        ED Course           Identification of Seniors at Risk      Most Recent Value   (ISAR) Identification of Seniors at Risk   Before the illness or injury that brought you to the Emergency, did you need someone to help you on a regular basis? 1 Filed at: 11/16/2019 1117   In the last 24 hours, have you needed more help than usual?  0 Filed at: 11/16/2019 1117   Have you been hospitalized for one or more nights during the past 6 months? 0 Filed at: 11/16/2019 1117   In general, do you see well?  0 Filed at: 11/16/2019 1117   In general, do you have serious problems with your memory? 1 Filed at: 11/16/2019 1117   Do you take more than three different medications every day? 1 Filed at: 11/16/2019 1117   ISAR Score  3 Filed at: 11/16/2019 1117                          MDM  Number of Diagnoses or Management Options  Cellulitis: new and does not require workup  Diagnosis management comments: Initial evaluation:  Patient is an 51-year-old female who presents with right lower extremity swelling, tenderness, warmth that is likely due to cellulitis with known skin breakdown recently  Patient is a nondiabetic  We will get basic lab work  Initial vital signs are negative and patient does not meet SIRS criteria  No concern for sepsis at this time  We will get right lower extremity ultrasound to rule out DVT  Final evaluation:  Patient is a 5year-old female who presents with lower extremity swelling, tenderness, warmth, pain found to be due to cellulitis  Duplex study was negative for any DVT  Blood work was consistent with her baseline  Patient was at her baseline mental status and vital signs were normal   Patient will be placed on 7 days of doxycycline for cellulitis  Amount and/or Complexity of Data Reviewed  Clinical lab tests: reviewed and ordered  Tests in the radiology section of CPT®: ordered and reviewed    Risk of Complications, Morbidity, and/or Mortality  Presenting problems: moderate  Diagnostic procedures: low  Management options: low    Patient Progress  Patient progress: improved      Disposition  Final diagnoses:   Cellulitis     Time reflects when diagnosis was documented in both MDM as applicable and the Disposition within this note     Time User Action Codes Description Comment    11/16/2019  1:33 PM Denys Jameel Add [L03 90] Cellulitis       ED Disposition     ED Disposition Condition Date/Time Comment    Discharge Stable Sat Nov 16, 2019  1:33 PM Viktor Coombs discharge to home/self care              Follow-up Information     Follow up With Specialties Details Why Contact Info Additional [address] Emergency Department Emergency Medicine  If symptoms worsen [address]  [phone]  ED, [address] [place] [address] 108          Discharge Medication List as of 11/16/2019  1:34 PM      START taking these medications    Details   doxycycline hyclate (VIBRAMYCIN) 100 mg capsule Take 1 capsule (100 mg total) by mouth 2 (two) times a day for 7 days, Starting Sat 11/16/2019, Until Sat 11/23/2019, Print         CONTINUE these medications which have NOT CHANGED    Details   !! acetaminophen (TYLENOL) 500 mg tablet Take 500 mg by mouth 3 (three) times a day, Historical Med      !! acetaminophen (TYLENOL) 500 mg tablet Take 500 mg by mouth every 6 (six) hours as needed for mild pain, Historical Med      !! ALPRAZolam (XANAX) 0 25 mg tablet TAKE 1 TABLET BY MOUTH DAILY , Normal      !! aspirin 81 mg chewable tablet Chew 81 mg daily, Historical Med      Calcium Carb-Cholecalciferol 500-400 MG-UNIT TABS Take 1 tablet by mouth daily, Historical Med      !! calcium carbonate (TUMS) 500 mg chewable tablet Chew 1 tablet every 4 (four) hours as needed for indigestion or heartburn, Historical Med      !! levothyroxine 100 mcg tablet Take 137 mcg by mouth daily , Historical Med      !! MAPAP 500 MG tablet TAKE 1 TABLET BY MOUTH EVERY 6 HOURS AS NEEDED, Normal      Meclizine HCl (MEDI-MECLIZINE PO) Take 25 mg by mouth every 6 (six) hours as needed, Historical Med      Menthol, Topical Analgesic, (BIOFREEZE) 4 % GEL Apply topically daily, Historical Med      !! metoprolol tartrate (LOPRESSOR) 50 mg tablet Take 25 mg by mouth daily, Historical Med      !! multivitamin (THERAGRAN) TABS TAKE 1 TABLET BY MOUTH DAILY  , Starting Tue 11/27/2018, Normal      Nutritional Supplements (ENSURE) Take 1 Can by mouth daily, Historical Med      !! senna-docusate sodium (SENOKOT-S) 8 6-50 mg per tablet Take 1 tablet by mouth daily as needed for constipation, Starting Tue 10/16/2018, No Print      sertraline (ZOLOFT) 100 mg tablet Take 50 mg by mouth daily, Historical Med      !! ALPRAZolam (XANAX) 0 25 mg tablet Take 0 25 mg by mouth daily, Historical Med      !! ALPRAZolam (XANAX) 0 25 mg tablet Take 0 25 mg by mouth daily as needed for anxiety, Historical Med      !! aspirin 81 mg chewable tablet Chew 81 mg daily, Historical Med      !! calcium carbonate (TUMS) 500 mg chewable tablet Chew 1 tablet (500 mg total) every 4 (four) hours as needed for indigestion or heartburn, Starting Tue 10/16/2018, No Print      Calcium Carbonate-Vitamin D 600-400 MG-UNIT per tablet TAKE 1 TABLET BY MOUTH ONCE DAILY , Normal      Calcium-Vitamin D ([employer]S CALCIUM-600/VIT D PO) Take 1 tablet by mouth daily, Historical Med      dimenhyDRINATE (DRAMAMINE) 50 mg tablet Take 0 5 tablets (25 mg total) by mouth every 6 (six) hours as needed for movement disorders (dizziness), Starting Tue 10/16/2018, No Print      !! levothyroxine 100 mcg tablet Take 137 mcg by mouth daily, Historical Med      !! metoprolol tartrate (LOPRESSOR) 50 mg tablet Take 50 mg by mouth daily, Historical Med      !! multivitamin (THERAGRAN) TABS Take 1 tablet by mouth daily, Historical Med      !! Sennosides-Docusate Sodium (SENNALAX-S PO) Take 1 tablet by mouth daily as needed, Historical Med       !! - Potential duplicate medications found  Please discuss with provider  No discharge procedures on file  ED Provider  Attending physically available and evaluated Carmen Campbell  JAMES managed the patient along with the ED Attending      Electronically Signed by         Francisco Starr MD  11/16/19 0712

## 2019-11-16 NOTE — ED NOTES
Family at the bedside indicated that EMS will need to be contacted for transport back to the facility  They are aware that transport can take hours  Will order a meal tray for the pt       Shanique Hemphill RN  11/16/19 9717

## 2019-11-16 NOTE — ED NOTES
Transportation as follows:    CHRISTIANE @  [phone] no availability for today  Anamaria Orozco will call back     Courtney Orantes RN  11/16/19 0839

## 2019-11-16 NOTE — ED NOTES
Juanita ta to  patient @ 1600 via 1000 [facility], [address] [place] [address]  11/16/19 8300

## 2019-11-16 NOTE — ED ATTENDING ATTESTATION
I,Andrea Trujillo MD, saw and evaluated the patient  I have discussed the patient with the resident/non-physician practitioner and agree with the resident's/non-physician practitioner's findings, Plan of Care, and MDM as documented in the resident's/non-physician practitioner's note, except where noted  All available labs and Radiology studies were reviewed  I was present for key portions of any procedure(s) performed by the resident/non-physician practitioner and I was immediately available to provide assistance  At this point I agree with the current assessment done in the Emergency Department  I have conducted an independent evaluation of this patient including a focused history and a physical exam         60-year-old female presenting to the emergency department for evaluation of right foot and lower extremity erythema, swelling  Patient had a history of a fall with an abrasion to the posterior right leg on October 31, 2019  Patient had been doing well until the past 24 hours when she has developed erythema and swelling in this leg  On examination, patient is resting comfortably in the stretcher, lungs are clear, heart is regular rate rhythm, abdomen is soft and nontender  Patient has moderate edema of the right lower extremity from the mid tibia down through the foot  There is erythema anteriorly  There is eschar on the posterior aspect of the right lower extremity corresponding to the patient's previous injury  The patient is noted to have some ecchymosis at the base toe 2  And 3 on the right foot  There is no tenderness to palpation throughout  Assessment and plan:  X-ray for fracture, likely cellulitis, duplex to rule out DVT      Labs Reviewed   CBC AND DIFFERENTIAL - Abnormal       Result Value Ref Range Status    WBC 7 07  4 31 - 10 16 Thousand/uL Final    RBC 4 10  3 81 - 5 12 Million/uL Final    Hemoglobin 13 3  11 5 - 15 4 g/dL Final    Hematocrit 40 5  34 8 - 46 1 % Final    MCV 99 (*) 82 - 98 fL Final    MCH 32 4  26 8 - 34 3 pg Final    MCHC 32 8  31 4 - 37 4 g/dL Final    RDW 12 2  11 6 - 15 1 % Final    MPV 9 7  8 9 - 12 7 fL Final    Platelets 228  551 - 390 Thousands/uL Final    nRBC 0  /100 WBCs Final    Neutrophils Relative 65  43 - 75 % Final    Immat GRANS % 0  0 - 2 % Final    Lymphocytes Relative 22  14 - 44 % Final    Monocytes Relative 10  4 - 12 % Final    Eosinophils Relative 3  0 - 6 % Final    Basophils Relative 0  0 - 1 % Final    Neutrophils Absolute 4 60  1 85 - 7 62 Thousands/µL Final    Immature Grans Absolute 0 02  0 00 - 0 20 Thousand/uL Final    Lymphocytes Absolute 1 57  0 60 - 4 47 Thousands/µL Final    Monocytes Absolute 0 67  0 17 - 1 22 Thousand/µL Final    Eosinophils Absolute 0 18  0 00 - 0 61 Thousand/µL Final    Basophils Absolute 0 03  0 00 - 0 10 Thousands/µL Final   COMPREHENSIVE METABOLIC PANEL - Abnormal    Sodium 143  136 - 145 mmol/L Final    Potassium 3 7  3 5 - 5 3 mmol/L Final    Chloride 106  100 - 108 mmol/L Final    CO2 28  21 - 32 mmol/L Final    ANION GAP 9  4 - 13 mmol/L Final    BUN 18  5 - 25 mg/dL Final    Creatinine 0 67  0 60 - 1 30 mg/dL Final    Comment: Standardized to IDMS reference method    Glucose 91  65 - 140 mg/dL Final    Comment:   If the patient is fasting, the ADA then defines impaired fasting glucose as > 100 mg/dL and diabetes as > or equal to 123 mg/dL  Specimen collection should occur prior to Sulfasalazine administration due to the potential for falsely depressed results  Specimen collection should occur prior to Sulfapyridine administration due to the potential for falsely elevated results  Calcium 9 2  8 3 - 10 1 mg/dL Final    AST 50 (*) 5 - 45 U/L Final    Comment:   Specimen collection should occur prior to Sulfasalazine administration due to the potential for falsely depressed results       ALT 27  12 - 78 U/L Final    Comment:   Specimen collection should occur prior to Sulfasalazine and/or Sulfapyridine administration due to the potential for falsely depressed results  Alkaline Phosphatase 86  46 - 116 U/L Final    Total Protein 7 1  6 4 - 8 2 g/dL Final    Albumin 3 8  3 5 - 5 0 g/dL Final    Total Bilirubin 0 33  0 20 - 1 00 mg/dL Final    eGFR 78  ml/min/1 73sq m Final    Narrative:     [place] guidelines for Chronic Kidney Disease (CKD):     Stage 1 with normal or high GFR (GFR > 90 mL/min/1 73 square meters)    Stage 2 Mild CKD (GFR = 60-89 mL/min/1 73 square meters)    Stage 3A Moderate CKD (GFR = 45-59 mL/min/1 73 square meters)    Stage 3B Moderate CKD (GFR = 30-44 mL/min/1 73 square meters)    Stage 4 Severe CKD (GFR = 15-29 mL/min/1 73 square meters)    Stage 5 End Stage CKD (GFR <15 mL/min/1 73 square meters)  Note: GFR calculation is accurate only with a steady state creatinine       XR foot 3+ views RIGHT   ED Interpretation   No fracture  VAS lower limb venous duplex study, unilateral/limited    (Results Pending)     Duplex was reported as negative

## 2019-11-20 LAB
ATRIAL RATE: 69 BPM
P AXIS: 36 DEGREES
PR INTERVAL: 180 MS
QRS AXIS: 42 DEGREES
QRSD INTERVAL: 94 MS
QT INTERVAL: 410 MS
QTC INTERVAL: 439 MS
T WAVE AXIS: 25 DEGREES
VENTRICULAR RATE: 69 BPM

## 2019-11-20 PROCEDURE — 93010 ELECTROCARDIOGRAM REPORT: CPT | Performed by: INTERNAL MEDICINE

## 2019-11-21 ENCOUNTER — HOSPITAL ENCOUNTER (EMERGENCY)
Facility: HOSPITAL | Age: 84
Discharge: NURSING, REHAB OR LONG-TERM CARE | End: 2019-11-22
Attending: EMERGENCY MEDICINE
Payer: MEDICARE

## 2019-11-21 ENCOUNTER — APPOINTMENT (EMERGENCY)
Dept: RADIOLOGY | Facility: HOSPITAL | Age: 84
End: 2019-11-21
Payer: MEDICARE

## 2019-11-21 VITALS
WEIGHT: 141 LBS | TEMPERATURE: 98.3 F | OXYGEN SATURATION: 96 % | HEIGHT: 66 IN | DIASTOLIC BLOOD PRESSURE: 84 MMHG | RESPIRATION RATE: 16 BRPM | BODY MASS INDEX: 22.66 KG/M2 | HEART RATE: 73 BPM | SYSTOLIC BLOOD PRESSURE: 172 MMHG
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
## 2019-11-21 PROCEDURE — 99285 EMERGENCY DEPT VISIT HI MDM: CPT | Performed by: EMERGENCY MEDICINE

## 2019-11-21 PROCEDURE — 99285 EMERGENCY DEPT VISIT HI MDM: CPT

## 2019-11-21 PROCEDURE — 93005 ELECTROCARDIOGRAM TRACING: CPT

## 2019-11-21 PROCEDURE — 70450 CT HEAD/BRAIN W/O DYE: CPT

## 2019-11-21 PROCEDURE — 72125 CT NECK SPINE W/O DYE: CPT

## 2019-11-22 LAB
ATRIAL RATE: 73 BPM
P AXIS: 25 DEGREES
PR INTERVAL: 166 MS
QRS AXIS: 12 DEGREES
QRSD INTERVAL: 100 MS
QT INTERVAL: 432 MS
QTC INTERVAL: 475 MS
T WAVE AXIS: 48 DEGREES
VENTRICULAR RATE: 73 BPM

## 2019-11-22 PROCEDURE — 93010 ELECTROCARDIOGRAM REPORT: CPT | Performed by: INTERNAL MEDICINE

## 2019-11-22 NOTE — ED NOTES
Alex 854 staff aware of pt's disposition and transport time     Grace Salmon, [address] [facility]  11/22/19 6032

## 2019-11-22 NOTE — ED NOTES
Dr Sanderson Alert called into the room for pt evaluation at this time     Philip Meza RN  11/21/19 4804

## 2019-11-22 NOTE — DISCHARGE INSTRUCTIONS
You were seen today in the Emergency Department for a fall  Your workup included a CT of the head and neck and revealed no acute injuries from the fall  Please follow up with your Primary Care Provider in the next 1-2 days to to discuss repeated falls  Please return to the Emergency Department if you have fevers, chills, chest pain, shortness of breath, are unable to eat or drink, fall again or lose consciousness or have any other concerning symptoms  Please look this over and let your nurse and/or me know if you have any further questions before you leave  The steri strips placed on your leg should fall off on their own  Please monitor the wound for active bleeding, redness, warmth, or other signs of infections

## 2019-11-22 NOTE — ED PROVIDER NOTES
History  Chief Complaint   Patient presents with    Fall     Unwitnessed fall out of bed from [facility]  Pt has skin tear to R leg  Bleeding controlled by EMS  Unknown headstrike and LOC  +ASA  Per staff, pt acting appropriately      Angie Sender is an 80y o  year old female with PMHx significant for dementia, BPPV, hypothyroidism, and chronic venous insufficient today after a fall had her nursing home  Per staff, the fall was unwitnessed and is unknown if the patient hit her head or have loss of consciousness  Patient is complaining only of a mild headache that she cannot further qualify  She has not had any pain anywhere else in her body  She does not remember falling  History provided by:  Patient   used: No    Fall   Mechanism of injury: fall    Injury location: unknown  Incident location:  Home  Arrived directly from scene: yes    Fall:     Fall occurred:  Unable to specify    Impact surface:  Unable to specify    Point of impact:  Unable to specify  Tetanus status:  Up to date  Associated symptoms: headaches    Associated symptoms: no abdominal pain, no back pain, no chest pain, no difficulty breathing, no nausea and no neck pain    Risk factors comment:  Aspirin      Prior to Admission Medications   Prescriptions Last Dose Informant Patient Reported? Taking? ALPRAZolam (XANAX) 0 25 mg tablet   No No   Sig: TAKE 1 TABLET BY MOUTH DAILY  ALPRAZolam (XANAX) 0 25 mg tablet   Yes Yes   Sig: Take 0 25 mg by mouth daily   ALPRAZolam (XANAX) 0 25 mg tablet   Yes No   Sig: Take 0 25 mg by mouth daily as needed for anxiety   Calcium Carb-Cholecalciferol 500-400 MG-UNIT TABS   Yes Yes   Sig: Take 1 tablet by mouth daily   Calcium Carbonate-Vitamin D 600-400 MG-UNIT per tablet   No No   Sig: TAKE 1 TABLET BY MOUTH ONCE DAILY     Calcium-Vitamin D ([employer] CALCIUM-600/VIT D PO)   Yes No   Sig: Take 1 tablet by mouth daily   MAPAP 500 MG tablet   No No   Sig: TAKE 1 TABLET BY MOUTH EVERY 6 HOURS AS NEEDED   Meclizine HCl (MEDI-MECLIZINE PO)   Yes No   Sig: Take 25 mg by mouth every 6 (six) hours as needed   Menthol, Topical Analgesic, (BIOFREEZE) 4 % GEL   Yes No   Sig: Apply topically daily   Nutritional Supplements (ENSURE)   Yes No   Sig: Take 1 Can by mouth daily   Sennosides-Docusate Sodium (SENNALAX-S PO)   Yes No   Sig: Take 1 tablet by mouth daily as needed   acetaminophen (TYLENOL) 500 mg tablet   Yes No   Sig: Take 500 mg by mouth 3 (three) times a day   acetaminophen (TYLENOL) 500 mg tablet   Yes No   Sig: Take 500 mg by mouth every 6 (six) hours as needed for mild pain   aspirin 81 mg chewable tablet   Yes Yes   Sig: Chew 81 mg daily   aspirin 81 mg chewable tablet   Yes No   Sig: Chew 81 mg daily   calcium carbonate (TUMS) 500 mg chewable tablet   No No   Sig: Chew 1 tablet (500 mg total) every 4 (four) hours as needed for indigestion or heartburn   calcium carbonate (TUMS) 500 mg chewable tablet   Yes No   Sig: Chew 1 tablet every 4 (four) hours as needed for indigestion or heartburn   dimenhyDRINATE (DRAMAMINE) 50 mg tablet   No No   Sig: Take 0 5 tablets (25 mg total) by mouth every 6 (six) hours as needed for movement disorders (dizziness)   doxycycline hyclate (VIBRAMYCIN) 100 mg capsule   No Yes   Sig: Take 1 capsule (100 mg total) by mouth 2 (two) times a day for 7 days   levothyroxine 100 mcg tablet   Yes No   Sig: Take 137 mcg by mouth daily    levothyroxine 100 mcg tablet   Yes Yes   Sig: Take 137 mcg by mouth daily   metoprolol tartrate (LOPRESSOR) 50 mg tablet   Yes No   Sig: Take 25 mg by mouth daily   metoprolol tartrate (LOPRESSOR) 50 mg tablet   Yes Yes   Sig: Take 50 mg by mouth daily   multivitamin (THERAGRAN) TABS   No No   Sig: TAKE 1 TABLET BY MOUTH DAILY     multivitamin (THERAGRAN) TABS   Yes No   Sig: Take 1 tablet by mouth daily   senna-docusate sodium (SENOKOT-S) 8 6-50 mg per tablet   No No   Sig: Take 1 tablet by mouth daily as needed for constipation   sertraline (ZOLOFT) 100 mg tablet   Yes No   Sig: Take 50 mg by mouth daily      Facility-Administered Medications: None       Past Medical History:   Diagnosis Date    Anxiety     Benign positional vertigo     Breast cancer, right (HCC)     Dr Keely CHERRY    Chronic venous insufficiency     Depression     [facility] fracture ([facility] [address] ) 2013    HTN (hypertension), benign     Hypertension     Hypothyroidism     Left knee DJD     Malignant ne[facility] of right [facility] ([email or web address]     Nontoxic uninodular goiter     Right knee DJD        Past Surgical History:   Procedure Laterality Date    INTERTROCHANTERIC HIP FRACTURE SURGERY  2013       History reviewed  No pertinent family history  I have reviewed and agree with the history as documented  Social History     Tobacco Use    Smoking status: Never Smoker    Smokeless tobacco: Never Used   Substance Use Topics    Alcohol use: Not Currently    Drug use: Never        Review of Systems   Unable to perform ROS: Dementia   Cardiovascular: Negative for chest pain  Gastrointestinal: Negative for abdominal pain and nausea  Musculoskeletal: Negative for back pain and neck pain  Neurological: Positive for headaches  Physical Exam  ED Triage Vitals [11/21/19 2251]   Temperature Pulse Respirations Blood Pressure SpO2   98 3 °F (36 8 °C) 73 16 (!) 172/84 96 %      Temp Source Heart Rate Source Patient Position - Orthostatic VS BP Location FiO2 (%)   Oral -- -- -- --      Pain Score       No Pain             Orthostatic Vital Signs  Vitals:    11/21/19 2251   BP: (!) 172/84   Pulse: 73       Physical Exam   Constitutional: She appears well-developed and well-nourished  No distress  HENT:   Head: Normocephalic and atraumatic  Mouth/Throat: Oropharynx is clear and moist  No oropharyngeal exudate  No Connor sign, [place] eyes, hemotympanum  No other external signs of head trauma   Eyes: Pupils are equal, round, and reactive to light   Conjunctivae and EOM are normal  No scleral icterus  Neck: Neck supple  No JVD present  No tracheal deviation present  No midline cervical spine tenderness   Cardiovascular: Normal rate, regular rhythm and intact distal pulses  Pulmonary/Chest: Effort normal and breath sounds normal  No respiratory distress  Abdominal: Soft  She exhibits no distension  There is no tenderness  There is no guarding  Musculoskeletal: She exhibits no edema or deformity  No tenderness to palpation on b/l LE, b/l UE, over hip or shoulder joints   Neurological: She is alert  Oriented to person but not place or time  Moves all extremities  Unable to perform thorough and reliable neuro exam secondary to patient unable to follow commands   Skin: Skin is warm and dry  Capillary refill takes less than 2 seconds  No rash noted  She is not diaphoretic  5-6cm curvilinear skin tear on lateral aspect of RLE   Psychiatric: She has a normal mood and affect  Nursing note and vitals reviewed  ED Medications  Medications - No data to display    Diagnostic Studies  Results Reviewed     None                 CT head without contrast   Final Result by Arnaldo Gonzales MD (11/21 1220)      No acute intracranial abnormality  Moderate chronic small vessel ischemic changes and mild volume loss  Workstation performed: [id number]         CT cervical spine without contrast   Final Result by Arnaldo Gonzales MD (11/22 0010)      No cervical spine fracture or traumatic malalignment        Moderate to severe multilevel spondylotic degenerative changes of the cervical spine, stable from the prior exam              Workstation performed: [id number]               Procedures  Procedures        ED Course  ED Course as of Nov 22 0202 Fri Nov 22, 2019   0006 Procedure Note: EKG  Date/Time: 11/22/19 12:07 AM   Interpreted by: Lang Laura DO  Indications / Diagnosis: fall  ECG reviewed by me, the ED Provider: yes   The EKG demonstrates:  Rhythm: rate 73, normal sinus  Intervals: normal intervals  Axis: normal axis  QRS/Blocks: normal QRS  ST Changes: No acute ST Changes, no STD/MARSHA  Identification of Seniors at Risk      Most Recent Value   (ISAR) Identification of Seniors at Risk   Before the illness or injury that brought you to the Emergency, did you need someone to help you on a regular basis? 1 Filed at: 11/21/2019 2303   In the last 24 hours, have you needed more help than usual?  0 Filed at: 11/21/2019 2303   Have you been hospitalized for one or more nights during the past 6 months? 1 Filed at: 11/21/2019 2303   In general, do you see well? 1 Filed at: 11/21/2019 2303   In general, do you have serious problems with your memory? 1 Filed at: 11/21/2019 2303   Do you take more than three different medications every day? 1 Filed at: 11/21/2019 2303   ISAR Score  5 Filed at: 11/21/2019 2303                          MDM  Number of Diagnoses or Management Options  Diagnosis management comments: Will image head and neck for traumatic injuries        Amount and/or Complexity of Data Reviewed  Clinical lab tests: ordered and reviewed  Tests in the radiology section of CPT®: ordered and reviewed  Tests in the medicine section of CPT®: ordered and reviewed  Review and summarize past medical records: yes  Discuss the patient with other providers: yes    Risk of Complications, Morbidity, and/or Mortality  Presenting problems: low  Diagnostic procedures: low  Management options: low    Patient Progress  Patient progress: stable      Disposition  Final diagnoses:   Fall, initial encounter     Time reflects when diagnosis was documented in both MDM as applicable and the Disposition within this note     Time User Action Codes Description Comment    11/22/2019 12:04 AM Daniel Gandhi [[id number]  Charu Conteh, initial encounter       ED Disposition     ED Disposition Condition Date/Time Comment    Discharge Good Fri Nov 22, 2019 12:05 AM Jessica Cross discharge to home/self care  Return precautions given and questions answered  Follow-up Information     Follow up With Specialties Details Why Contact Info    Armond Villaseñor, [address]  Surgical, Nurse Practitioner Call in 1 day To discuss repeated falls Jewell Corie [facility] 3  [phone]            Patient's Medications   Discharge Prescriptions    No medications on file     No discharge procedures on file  ED Provider  Attending physically available and evaluated Shakir Mccormack I managed the patient along with the ED Attending      Electronically Signed by         Geetha Graham DO  11/22/19 8097

## 2019-11-22 NOTE — ED NOTES
Another staff member called for update   They are aware of transport time and disposition as well     Criss Lesch, RN  11/22/19 0149

## 2019-11-22 NOTE — ED ATTENDING ATTESTATION
11/21/2019  I, Mark Bingham MD, saw and evaluated the patient  I have discussed the patient with the resident/non-physician practitioner and agree with the resident's/non-physician practitioner's findings, Plan of Care, and MDM as documented in the resident's/non-physician practitioner's note, except where noted  All available labs and Radiology studies were reviewed  I was present for key portions of any procedure(s) performed by the resident/non-physician practitioner and I was immediately available to provide assistance  At this point I agree with the current assessment done in the Emergency Department  I have conducted an independent evaluation of this patient a history and physical is as follows:    ED Course         Critical Care Time  Procedures     81 yo female with dementia from dementia unit had unwitnessed fall out of bed  Pt with headache, no nausea  No cp, no abdominal pain  Pt with unknown head trauma, unknown loc  Pt with abrasion on leg  Vss, afebrile, lungs cta, rrr, abdomen soft nontender, no spinal tenderness, abrasion on right leg  No nystagmus, no facial droop   Ct head, cspine,

## 2019-12-04 ENCOUNTER — HOSPITAL ENCOUNTER (EMERGENCY)
Facility: HOSPITAL | Age: 84
Discharge: NURSING, REHAB OR LONG-TERM CARE | End: 2019-12-04
Attending: SURGERY | Admitting: SURGERY
Payer: MEDICARE

## 2019-12-04 ENCOUNTER — APPOINTMENT (EMERGENCY)
Dept: RADIOLOGY | Facility: HOSPITAL | Age: 84
End: 2019-12-04
Payer: MEDICARE

## 2019-12-04 VITALS
OXYGEN SATURATION: 95 % | HEART RATE: 80 BPM | RESPIRATION RATE: 16 BRPM | DIASTOLIC BLOOD PRESSURE: 88 MMHG | SYSTOLIC BLOOD PRESSURE: 151 MMHG | TEMPERATURE: 99 F | WEIGHT: 160 LBS

## 2019-12-04 DIAGNOSIS — L03.115 CELLULITIS OF RIGHT LOWER LEG: Primary | ICD-10-CM
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
## 2019-12-04 LAB
BASE EXCESS BLDA CALC-SCNC: 6 MMOL/L (ref -2–3)
CA-I BLD-SCNC: 1.13 MMOL/L (ref 1.12–1.32)
GLUCOSE SERPL-MCNC: 104 MG/DL (ref 65–140)
HCO3 BLDA-SCNC: 30.4 MMOL/L (ref 24–30)
HCT VFR BLD CALC: 35 % (ref 34.8–46.1)
HGB BLDA-MCNC: 11.9 G/DL (ref 11.5–15.4)
PCO2 BLD: 32 MMOL/L (ref 21–32)
PCO2 BLD: 40.8 MM HG (ref 42–50)
PH BLD: 7.48 [PH] (ref 7.3–7.4)
PO2 BLD: 24 MM HG (ref 35–45)
POTASSIUM BLD-SCNC: 3.5 MMOL/L (ref 3.5–5.3)
SAO2 % BLD FROM PO2: 48 % (ref 60–85)
SODIUM BLD-SCNC: 143 MMOL/L (ref 136–145)
SPECIMEN SOURCE: ABNORMAL

## 2019-12-04 PROCEDURE — 70450 CT HEAD/BRAIN W/O DYE: CPT

## 2019-12-04 PROCEDURE — 85014 HEMATOCRIT: CPT

## 2019-12-04 PROCEDURE — 82947 ASSAY GLUCOSE BLOOD QUANT: CPT

## 2019-12-04 PROCEDURE — 99284 EMERGENCY DEPT VISIT MOD MDM: CPT

## 2019-12-04 PROCEDURE — 71045 X-RAY EXAM CHEST 1 VIEW: CPT

## 2019-12-04 PROCEDURE — 72125 CT NECK SPINE W/O DYE: CPT

## 2019-12-04 PROCEDURE — 84295 ASSAY OF SERUM SODIUM: CPT

## 2019-12-04 PROCEDURE — 99282 EMERGENCY DEPT VISIT SF MDM: CPT | Performed by: SURGERY

## 2019-12-04 PROCEDURE — 82330 ASSAY OF CALCIUM: CPT

## 2019-12-04 PROCEDURE — NC001 PR NO CHARGE: Performed by: EMERGENCY MEDICINE

## 2019-12-04 PROCEDURE — 84132 ASSAY OF SERUM POTASSIUM: CPT

## 2019-12-04 PROCEDURE — 82803 BLOOD GASES ANY COMBINATION: CPT

## 2019-12-04 RX ORDER — MULTIVITAMIN
1 TABLET ORAL DAILY
COMMUNITY

## 2019-12-04 RX ORDER — SERTRALINE HYDROCHLORIDE 25 MG/1
25 TABLET, FILM COATED ORAL DAILY
COMMUNITY

## 2019-12-04 RX ORDER — ASPIRIN 81 MG/1
81 TABLET ORAL DAILY
COMMUNITY
End: 2020-03-16 | Stop reason: HOSPADM

## 2019-12-04 RX ORDER — MECLIZINE HYDROCHLORIDE 25 MG/1
25 TABLET ORAL 3 TIMES DAILY PRN
COMMUNITY
End: 2020-03-16 | Stop reason: HOSPADM

## 2019-12-04 RX ORDER — AMOXICILLIN 250 MG
1 CAPSULE ORAL DAILY
COMMUNITY

## 2019-12-04 RX ORDER — CEPHALEXIN 500 MG/1
500 CAPSULE ORAL EVERY 8 HOURS SCHEDULED
Qty: 21 CAPSULE | Refills: 0 | Status: SHIPPED | OUTPATIENT
Start: 2019-12-04 | End: 2019-12-11

## 2019-12-04 RX ORDER — ALPRAZOLAM 0.25 MG/1
0.25 TABLET ORAL DAILY
COMMUNITY
End: 2020-03-16 | Stop reason: HOSPADM

## 2019-12-04 RX ORDER — METOPROLOL TARTRATE 50 MG/1
50 TABLET, FILM COATED ORAL EVERY 12 HOURS SCHEDULED
COMMUNITY
End: 2020-03-16 | Stop reason: HOSPADM

## 2019-12-04 RX ORDER — LEVOTHYROXINE SODIUM 0.12 MG/1
125 TABLET ORAL DAILY
COMMUNITY
End: 2020-03-16 | Stop reason: HOSPADM

## 2019-12-05 NOTE — ED PROVIDER NOTES
Level B Trauma    Alexandria Gilliam is an 80year old female with past medical history of dementia presenting as a Level B trauma after sustaining two falls today  Usually ambulates using a wheelchair  Both falls unwitnessed  Skin tear to left arm from fall earlier today  Most recent fall with concern for striking head  Pain to shoulder blade area and neck  Pain does not recall falls  Patient is awake, breathing comfortably on room air  No indication for intervention to the airway       Christopher Borden MD  12/04/19 2006

## 2019-12-05 NOTE — TRAUMA DOCUMENTATION
Logan TWP arrived in ED for pt transport of pt home to [facility]  All necessary paperwork supplied  Pt transported home with all belongings she arrived in ED with

## 2019-12-05 NOTE — DISCHARGE INSTRUCTIONS
Cellulitis, Ambulatory Care   GENERAL INFORMATION:   Cellulitis  is a skin infection caused by bacteria  Common symptoms include the following:   · Fever    · A red, warm, swollen area on your skin    · Pain when the area is touched    · Bumps or blisters (abscess) that may drain pus    · Bumpy, raised skin that feels like an orange peel  Seek immediate care for the following symptoms:   · An increase in pain, redness, warmth, and size    · Red streaks coming from the infected area    · A thin, gray-brown discharge coming from your infected skin area    · A crackling under your skin when you touch it    · Purple dots or bumps on your skin, or bleeding under your skin    · New swelling and pain in your legs    · Sudden trouble breathing or chest pain  Treatment for cellulitis  may include medicines to treat the bacterial infection or decrease pain  The infection may need to be cleaned out  Damaged, dead, or infected tissue may need to be cut away to help your wound heal   Manage your symptoms:   · Elevate your wound above the level of your heart  as often as you can  This will help decrease swelling and pain  Prop your wound on pillows or blankets to keep it elevated comfortably  · Clean your wound as directed  You may need to wash the wound with soap and water  Look for signs of infection  · Wear pressure stockings as directed  The stockings are tight and put pressure on your legs  This improves blood flow and decreases swelling  Prevent cellulitis:   · Wash your hands often  Use soap and water  Wash your hands after you use the bathroom, change a child's diapers, or sneeze  Wash your hands before you prepare or eat food  Use lotion to prevent dry, cracked skin  · Do not share personal items, such as towels, clothing, and razors  · Clean exercise equipment  with germ-killing  before and after you use it    Follow up with your healthcare provider as directed:  Write down your questions so you remember to ask them during your visits  CARE AGREEMENT:   You have the right to help plan your care  Learn about your health condition and how it may be treated  Discuss treatment options with your caregivers to decide what care you want to receive  You always have the right to refuse treatment  The above information is an  only  It is not intended as medical advice for individual conditions or treatments  Talk to your doctor, nurse or pharmacist before following any medical regimen to see if it is safe and effective for you  © 2014 [address] is for End User's use only and may not be sold, redistributed or otherwise used for commercial purposes  All illustrations and images included in CareNotes® are the copyrighted property of [employer]  or [address]

## 2019-12-05 NOTE — TRAUMA DOCUMENTATION
4 attempts made by both MD and ED RN to contact [facility] concerning pt report/discharge   Voicemail left for [facility] at this time

## 2019-12-05 NOTE — H&P
H&P Exam - Trauma   Corey Kebede 80 y o  female MRN: [id number]  Unit/Bed#: ED 20 Encounter: [id number]    Assessment/Plan   Trauma Alert: Level B  Model of Arrival: Ambulance  Trauma Team: Attending Hector Garcia and Karlee Garcia  Consultants: None    Trauma Active Problems:   Fall  Right lower leg cellulitis    Trauma Plan:   -No identified traumatic injuries  Pt has a h/o ambulatory dysfunction and is on a bed alert at her nursing home  D/c back with continued fall precautions  -unclear based on the records provided whether she has already been initiated on treatment for this  Will start her on Keflex 500mg t i d  For 1 week and have her follow up with her primary care physician  Chief Complaint: Fall    History of Present Illness   HPI:  Corey Kebede is a 80 y o  female who presenting from [place] [address] for evaluation after a fall out of her wheelchair  EMS provides the history as the patient has severe dementia  She lives at [facility] and has frequent falls  They have her on a bed alert when she is sitting in her wheelchair  Today the bed alert when off and they found her on the floor  She takes a daily aspirin and initially was complaining of some pain in her neck so she was placed in a C-collar and sent to the emergency department for further evaluation  Patient denies any complaints on presentation to the ER though is oriented only to person  She does not recall the incident today  Mechanism:Fall    Review of Systems   Unable to perform ROS: Dementia       12-point, complete review of systems was reviewed and negative except as stated above  Historical Information   History is unobtainable from the patient due to dementia  Efforts to obtain history included the following sources: EMS    Past Medical History:   Diagnosis Date    Depression     Hypothyroidism      History reviewed  No pertinent surgical history    Social History   Social History     Substance and Sexual Activity   Alcohol Use Not Currently     Social History     Substance and Sexual Activity   Drug Use Not Currently     Social History     Tobacco Use   Smoking Status Never Smoker   Smokeless Tobacco Never Used       There is no immunization history on file for this patient  Last Tetanus: 2019  Family History: Non-contributory  Unable to obtain/limited by dementia      Meds/Allergies   PTA meds:   Prior to Admission Medications   Prescriptions Last Dose Informant Patient Reported? Taking?    ALPRAZolam (XANAX) 0 25 mg tablet   Yes Yes   Sig: Take 0 25 mg by mouth daily   Calcium 600-400 MG-UNIT CHEW   Yes Yes   Sig: Chew   Multiple Vitamin (MULTIVITAMIN) tablet   Yes Yes   Sig: Take 1 tablet by mouth daily   aspirin (ECOTRIN LOW STRENGTH) 81 mg EC tablet   Yes Yes   Sig: Take 81 mg by mouth daily   levothyroxine 125 mcg tablet   Yes Yes   Sig: Take 125 mcg by mouth daily   meclizine (ANTIVERT) 25 mg tablet   Yes Yes   Sig: Take 25 mg by mouth 3 (three) times a day as needed for dizziness   metoprolol tartrate (LOPRESSOR) 50 mg tablet   Yes Yes   Sig: Take 50 mg by mouth every 12 (twelve) hours   senna-docusate sodium (SENOKOT S) 8 6-50 mg per tablet   Yes Yes   Sig: Take 1 tablet by mouth daily   sertraline (ZOLOFT) 25 mg tablet   Yes Yes   Sig: Take 25 mg by mouth daily      Facility-Administered Medications: None       Allergies   Allergen Reactions    Sulindac          PHYSICAL EXAM    PE limited by: dementia    Objective   Vitals:   First set: Temperature: 99 °F (37 2 °C) (12/04/19 2006)  Pulse: 72 (12/04/19 2006)  Respirations: 16 (12/04/19 2006)  Blood Pressure: (!) 182/81 (12/04/19 2006)    Primary Survey:   (A) Airway: intact  (B) Breathing: CTAB  (C) Circulation: Pulses:   normal  (D) Disabliity:  Eye Opening:   Spontaneous = 4, Motor Response: Obeys commands = 6 and Verbal Response:  Confused = 4  (E) Expose:  Completed    Secondary Survey: (Click on Physical Exam tab above)  Physical Exam Constitutional: She appears well-developed and well-nourished  No distress  HENT:   Head: Normocephalic and atraumatic  Right Ear: External ear normal    Left Ear: External ear normal    Mouth/Throat: Oropharynx is clear and moist    Eyes: Pupils are equal, round, and reactive to light  EOM are normal    Neck: Normal range of motion  Neck supple  No tracheal deviation present  Cardiovascular: Normal rate, regular rhythm, normal heart sounds and intact distal pulses  Pulmonary/Chest: Effort normal and breath sounds normal  No respiratory distress  She exhibits no tenderness  Abdominal: Soft  Bowel sounds are normal  There is no tenderness  Musculoskeletal: Normal range of motion  She exhibits no edema, tenderness or deformity  No midline spinal tenderness, no tenderness to anterior posterior or lateral compression of the chest wall, pelvis is stable, no bony tenderness over the upper lower extremities  Neurological: She is alert  She exhibits normal muscle tone  Coordination normal    Skin: Skin is warm and dry  Capillary refill takes less than 2 seconds  Chronic wound to RLE, no errythema, warmth or ttp  Bandaged skin tear to LUE, CDI  Nursing note and vitals reviewed  Invasive Devices     Peripheral Intravenous Line            Peripheral IV 12/04/19 Left Forearm less than 1 day                Lab Results: ISTAT: No components found for: VBG  Imaging/EKG Studies: Results: I have personally reviewed pertinent reports  and I have personally reviewed pertinent films in PACS   Trauma - Ct Head Wo Contrast    Result Date: 12/4/2019  Impression: No acute intracranial abnormality  I personally discussed this study with Dr Sarah Riggins on 12/4/2019 at 8:38 PM  Workstation performed: [id number]     Trauma - Ct Spine Cervical Wo Contrast    Result Date: 12/4/2019  Impression:  No cervical spine fracture or traumatic malalignment   Incidental thyroid nodule(s) for which thyroid ultrasound is recommended    I personally discussed this study with Dr Sunil Villagomez on 12/4/2019 at 8:38 PM  Workstation performed: [id number]       Code Status: No Order  Advance Directive and Living Will:      Power of :    POLST:

## 2020-02-22 ENCOUNTER — HOSPITAL ENCOUNTER (EMERGENCY)
Facility: HOSPITAL | Age: 85
Discharge: HOME | End: 2020-02-22
Attending: EMERGENCY MEDICINE | Admitting: EMERGENCY MEDICINE
Payer: MEDICARE

## 2020-02-22 ENCOUNTER — APPOINTMENT (EMERGENCY)
Dept: RADIOLOGY | Facility: HOSPITAL | Age: 85
End: 2020-02-22
Payer: MEDICARE

## 2020-02-22 VITALS
OXYGEN SATURATION: 97 % | HEART RATE: 64 BPM | TEMPERATURE: 97.9 F | DIASTOLIC BLOOD PRESSURE: 89 MMHG | RESPIRATION RATE: 16 BRPM | SYSTOLIC BLOOD PRESSURE: 177 MMHG

## 2020-02-22 DIAGNOSIS — S09.90XA MINOR HEAD INJURY, INITIAL ENCOUNTER: ICD-10-CM
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
## 2020-02-22 PROCEDURE — 99284 EMERGENCY DEPT VISIT MOD MDM: CPT | Performed by: EMERGENCY MEDICINE

## 2020-02-22 PROCEDURE — 72125 CT NECK SPINE W/O DYE: CPT

## 2020-02-22 PROCEDURE — 99284 EMERGENCY DEPT VISIT MOD MDM: CPT

## 2020-02-22 PROCEDURE — 70450 CT HEAD/BRAIN W/O DYE: CPT

## 2020-02-22 PROCEDURE — NC001 PR NO CHARGE: Performed by: EMERGENCY MEDICINE

## 2020-02-22 NOTE — ED ATTENDING ATTESTATION
2/22/2020  I, Hemal Omer MD, saw and evaluated the patient  I have discussed the patient with the resident/non-physician practitioner and agree with the resident's/non-physician practitioner's findings, Plan of Care, and MDM as documented in the resident's/non-physician practitioner's note, except where noted  All available labs and Radiology studies were reviewed  I was present for key portions of any procedure(s) performed by the resident/non-physician practitioner and I was immediately available to provide assistance  At this point I agree with the current assessment done in the Emergency Department  I have conducted an independent evaluation of this patient a history and physical is as follows: According to EMS the patient was witnessed by her roommate to roll out of bed and fall to the floor  Patient reportedly struck the right side of her face either on the recliner chair next to the bed or the floor  The patient had no loss conscious and care was sought immediately  The patient was found to have evidence of injury to the right face and so EMS was summoned  The patient, according to the nursing home staff, has had no change in her mental status  The patient offers no complaints  Physical exam demonstrates an elderly female no acute distress  There is slight erythema over the right lateral forehead without deformity or crepitance  There is slight erythema to the right cheek  The remainder of the craniofacial exam was normal   The neck was supple nontender  The back was nontender  All extremities are nontender with a full range of motion  There is slight erythema to the anterior aspect of both knees but there is no tenderness, deformity, or crepitance  There is no evidence of effusions to the knees  The chest was nontender  Lungs are clear with equal breath sounds  The heart had a regular rate rhythm  The abdomen is soft and nontender with normal bowel sounds  Skin had no rash  The patient was awake and alert but not oriented to time     ED Course         Critical Care Time  Procedures

## 2020-02-22 NOTE — ED PROVIDER NOTES
H&P Exam - Trauma   Chelsea Boudreaux 80 y o  female MRN: [id number]  Unit/Bed#: ED 27/ED 27 Encounter: [id number]    Assessment/Plan   Trauma Alert: Trauma Acuity: C  Model of Arrival: Mode of Arrival: ALS via    Trauma Team: Current Providers  Attending Provider: Milton Abel MD  Resident: Gerhard Zaldivar MD  Registered Nurse: Philip Campos RN  ED Technician: Sue Titus  Consultants: None      Trauma Active Problems:  Fall from bed    Trauma Plan:  CT scan the patient's head and cervical spine and perform repeat evaluations  Chief Complaint:   Chief Complaint   Patient presents with    Fall     pt fell out of bed hit head on ASA  level C trauma       History of Present Illness   HPI:  Chelsea Boudreaux is a 80 y o  female who presents with a witnessed fall from bed  Staff from the patient's nursing home report that the patient's roommate saw her roll out of bed and struck the right side of her head either on the floor there recliner next to her bed  There is no reported mental status change and according to staff the patient's mental status is unchanged from her baseline  The patient offered no complaint to EMS or the nursing home staff  The patient offers no complaints to the emergency department staff  Mechanism:Details of Incident: pt fell out of bed, + head strike          HPI  Review of Systems    Historical Information     Immunizations:   Immunization History   Administered Date(s) Administered    Tdap 10/31/2019       Past Medical History:   Diagnosis Date    Anxiety     Benign positional vertigo     Breast cancer, right (HCC)     Dr Mc CHERRY Rule Chronic venous insufficiency     Depression     Hip fracture ([facility] [address] ) 2013    HTN (hypertension), benign     Hypertension     Hypothyroidism     Left knee DJD     Malignant neoplasm of right breast ([facility] [address] )     Nontoxic uninodular goiter     Right knee DJD      History reviewed  No pertinent family history    Past Surgical History:   Procedure Laterality Date   Maninder Sanz HIP FRACTURE SURGERY  2013     Social History     Socioeconomic History    Marital status:      Spouse name: None    Number of children: None    Years of education: None    Highest education level: None   Occupational History    None   Social Needs    Financial resource strain: None    Food insecurity:     Worry: None     Inability: None    Transportation needs:     Medical: None     Non-medical: None   Tobacco Use    Smoking status: Never Smoker    Smokeless tobacco: Never Used   Substance and Sexual Activity    Alcohol use: Not Currently    Drug use: Not Currently    Sexual activity: None   Lifestyle    Physical activity:     Days per week: None     Minutes per session: None    Stress: None   Relationships    Social connections:     Talks on phone: None     Gets together: None     Attends Holiness service: None     Active member of club or organization: None     Attends meetings of clubs or organizations: None     Relationship status: None    Intimate partner violence:     Fear of current or ex partner: None     Emotionally abused: None     Physically abused: None     Forced sexual activity: None   Other Topics Concern    None   Social History Narrative    ** Merged History Encounter **         ** Merged History Encounter **            Family History: non-contributory    Meds/Allergies   Prior to Admission Medications   Prescriptions Last Dose Informant Patient Reported? Taking? ALPRAZolam (XANAX) 0 25 mg tablet   No No   Sig: TAKE 1 TABLET BY MOUTH DAILY     ALPRAZolam (XANAX) 0 25 mg tablet   Yes No   Sig: Take 0 25 mg by mouth daily   ALPRAZolam (XANAX) 0 25 mg tablet   Yes No   Sig: Take 0 25 mg by mouth daily as needed for anxiety   ALPRAZolam (XANAX) 0 25 mg tablet   Yes No   Sig: Take 0 25 mg by mouth daily   Calcium 600-400 MG-UNIT CHEW   Yes No   Sig: Chew   Calcium Carb-Cholecalciferol 500-400 MG-UNIT TABS   Yes No Sig: Take 1 tablet by mouth daily   Calcium Carbonate-Vitamin D 600-400 MG-UNIT per tablet   No No   Sig: TAKE 1 TABLET BY MOUTH ONCE DAILY     Calcium-Vitamin D ([employer] CALCIUM-600/VIT D PO)   Yes No   Sig: Take 1 tablet by mouth daily   MAPAP 500 MG tablet   No No   Sig: TAKE 1 TABLET BY MOUTH EVERY 6 HOURS AS NEEDED   Meclizine HCl (MEDI-MECLIZINE PO)   Yes No   Sig: Take 25 mg by mouth every 6 (six) hours as needed   Menthol, Topical Analgesic, (BIOFREEZE) 4 % GEL   Yes No   Sig: Apply topically daily   Multiple Vitamin (MULTIVITAMIN) tablet   Yes No   Sig: Take 1 tablet by mouth daily   Nutritional Supplements (ENSURE)   Yes No   Sig: Take 1 Can by mouth daily   Sennosides-Docusate Sodium (SENNALAX-S PO)   Yes No   Sig: Take 1 tablet by mouth daily as needed   acetaminophen (TYLENOL) 500 mg tablet   Yes No   Sig: Take 500 mg by mouth 3 (three) times a day   acetaminophen (TYLENOL) 500 mg tablet   Yes No   Sig: Take 500 mg by mouth every 6 (six) hours as needed for mild pain   aspirin (ECOTRIN LOW STRENGTH) 81 mg EC tablet   Yes No   Sig: Take 81 mg by mouth daily   aspirin 81 mg chewable tablet   Yes No   Sig: Chew 81 mg daily   aspirin 81 mg chewable tablet   Yes No   Sig: Chew 81 mg daily   calcium carbonate (TUMS) 500 mg chewable tablet   No No   Sig: Chew 1 tablet (500 mg total) every 4 (four) hours as needed for indigestion or heartburn   calcium carbonate (TUMS) 500 mg chewable tablet   Yes No   Sig: Chew 1 tablet every 4 (four) hours as needed for indigestion or heartburn   dimenhyDRINATE (DRAMAMINE) 50 mg tablet   No No   Sig: Take 0 5 tablets (25 mg total) by mouth every 6 (six) hours as needed for movement disorders (dizziness)   levothyroxine 100 mcg tablet   Yes No   Sig: Take 137 mcg by mouth daily    levothyroxine 100 mcg tablet   Yes No   Sig: Take 137 mcg by mouth daily   levothyroxine 125 mcg tablet   Yes No   Sig: Take 125 mcg by mouth daily   meclizine (ANTIVERT) 25 mg tablet   Yes No   Sig: Take 25 mg by mouth 3 (three) times a day as needed for dizziness   metoprolol tartrate (LOPRESSOR) 50 mg tablet   Yes No   Sig: Take 25 mg by mouth daily   metoprolol tartrate (LOPRESSOR) 50 mg tablet   Yes No   Sig: Take 50 mg by mouth daily   metoprolol tartrate (LOPRESSOR) 50 mg tablet   Yes No   Sig: Take 50 mg by mouth every 12 (twelve) hours   multivitamin (THERAGRAN) TABS   No No   Sig: TAKE 1 TABLET BY MOUTH DAILY  multivitamin (THERAGRAN) TABS   Yes No   Sig: Take 1 tablet by mouth daily   senna-docusate sodium (SENOKOT S) 8 6-50 mg per tablet   Yes No   Sig: Take 1 tablet by mouth daily   senna-docusate sodium (SENOKOT-S) 8 6-50 mg per tablet   No No   Sig: Take 1 tablet by mouth daily as needed for constipation   sertraline (ZOLOFT) 100 mg tablet   Yes No   Sig: Take 50 mg by mouth daily   sertraline (ZOLOFT) 25 mg tablet   Yes No   Sig: Take 25 mg by mouth daily      Facility-Administered Medications: None       Allergies   Allergen Reactions    Sulindac      Unknown reaction and severity  Documented with note from previous PCP    Sulindac        PHYSICAL EXAM    PE limited by:  Patient's baseline confusion    Objective   Vitals:   First set: Temperature: 97 9 °F (36 6 °C) (02/22/20 0805)  Pulse: 75 (02/22/20 0805)  Respirations: 18 (02/22/20 0805)  Blood Pressure: (!) 219/93 (02/22/20 0805)  SpO2: 97 % (02/22/20 0805)    Primary Survey:   (A) Airway:  Intact  (B) Breathing:  Normal  (C) Circulation: Pulses:   normal  (D) Disabliity:  GCS Total:  14  (E) Expose:  Completed    Secondary Survey: (Click on Physical Exam tab above)  Physical Exam    Invasive Devices     Peripheral Intravenous Line            Peripheral IV 12/04/19 Left Forearm 79 days                Lab Results:   Results Reviewed     None                 Imaging Studies:   Direct to CT: Yes  TRAUMA - CT head wo contrast   Final Result by Paige Johnson MD (02/22 7846)      No acute intracranial abnormality  Microangiopathic changes  I personally discussed this study with Fredo Mcneilmariah on 2/22/2020 at 8:21 AM                Workstation performed: [id number]         TRAUMA - CT spine cervical wo contrast   Final Result by Ilia Vela MD (02/22 5799)      No cervical spine fracture or traumatic malalignment  Incidental thyroid nodule(s) for which nonemergent thyroid ultrasound is recommended  I personally discussed this study with Fredo Mcneilmariah on 2/22/2020 at 8:21 AM                Workstation performed: [id number]             Other Studies:  None    Code Status: No Order  Advance Directive and Living Will: Yes    Power of : Yes  POLST:      Procedures  Procedures         ED Course         MDM      Disposition  [facility] Transfer: No  Final diagnoses:   Accidental fall from bed, initial encounter   Minor head injury, initial encounter     Time reflects when diagnosis was documented in both MDM as applicable and the Disposition within this note     Time User Action Codes Description Comment    2/22/2020  8:22 AM Shahbaz Gonzalez Add [W06  XXXA] Accidental fall from bed, initial encounter     2/22/2020  8:23 AM Nga Jaime Add [S09 [id number]] Minor head injury, initial encounter       ED Disposition     ED Disposition Condition Date/Time Comment    Discharge Stable Sat Feb 22, 2020  8:21 AM Tomasa Mclean discharge to home/self care              Follow-up Information     Follow up With Specialties Details Why Contact Info Additional [address], [facility], Nurse Practitioner In 1 day  [address] [place] [address] [phone]       [facility] [address] [facility] Emergency Department Emergency Medicine  As needed, If symptoms worsen [address]  [phone]  ED, [address] [place] [address], [address] [phone]        Patient's Medications   Discharge Prescriptions    No medications on file     No discharge procedures on file      PDMP Review     None          ED Provider  Electronically Signed by         Fani Guillaume MD  02/22/20 4996

## 2020-02-22 NOTE — ED PROVIDER NOTES
History  Chief Complaint   Patient presents with    Fall     pt fell out of bed hit head on ASA  level C trauma     Patient is an 66-year-old female with a history of dementia who presents from a nursing home following a fall from bed  Per EMS, patient had a fall that was witnessed by her roommate this morning  Was noted to have a small hematoma to her forehead but was acting at baseline and had no other complaint on their evaluation  Patient is disoriented but is aware that she fell  Denies current complaint of my evaluation  Prior to Admission Medications   Prescriptions Last Dose Informant Patient Reported? Taking? ALPRAZolam (XANAX) 0 25 mg tablet   No No   Sig: TAKE 1 TABLET BY MOUTH DAILY  ALPRAZolam (XANAX) 0 25 mg tablet   Yes No   Sig: Take 0 25 mg by mouth daily   ALPRAZolam (XANAX) 0 25 mg tablet   Yes No   Sig: Take 0 25 mg by mouth daily as needed for anxiety   ALPRAZolam (XANAX) 0 25 mg tablet   Yes No   Sig: Take 0 25 mg by mouth daily   Calcium 600-400 MG-UNIT CHEW   Yes No   Sig: Chew   Calcium Carb-Cholecalciferol 500-400 MG-UNIT TABS   Yes No   Sig: Take 1 tablet by mouth daily   Calcium Carbonate-Vitamin D 600-400 MG-UNIT per tablet   No No   Sig: TAKE 1 TABLET BY MOUTH ONCE DAILY     Calcium-Vitamin D ([employer] CALCIUM-600/VIT D PO)   Yes No   Sig: Take 1 tablet by mouth daily   MAPAP 500 MG tablet   No No   Sig: TAKE 1 TABLET BY MOUTH EVERY 6 HOURS AS NEEDED   Meclizine HCl (MEDI-MECLIZINE PO)   Yes No   Sig: Take 25 mg by mouth every 6 (six) hours as needed   Menthol, Topical Analgesic, (BIOFREEZE) 4 % GEL   Yes No   Sig: Apply topically daily   Multiple Vitamin (MULTIVITAMIN) tablet   Yes No   Sig: Take 1 tablet by mouth daily   Nutritional Supplements (ENSURE)   Yes No   Sig: Take 1 Can by mouth daily   Sennosides-Docusate Sodium (SENNALAX-S PO)   Yes No   Sig: Take 1 tablet by mouth daily as needed   acetaminophen (TYLENOL) 500 mg tablet   Yes No   Sig: Take 500 mg by mouth 3 (three) times a day   acetaminophen (TYLENOL) 500 mg tablet   Yes No   Sig: Take 500 mg by mouth every 6 (six) hours as needed for mild pain   aspirin (ECOTRIN LOW STRENGTH) 81 mg EC tablet   Yes No   Sig: Take 81 mg by mouth daily   aspirin 81 mg chewable tablet   Yes No   Sig: Chew 81 mg daily   aspirin 81 mg chewable tablet   Yes No   Sig: Chew 81 mg daily   calcium carbonate (TUMS) 500 mg chewable tablet   No No   Sig: Chew 1 tablet (500 mg total) every 4 (four) hours as needed for indigestion or heartburn   calcium carbonate (TUMS) 500 mg chewable tablet   Yes No   Sig: Chew 1 tablet every 4 (four) hours as needed for indigestion or heartburn   dimenhyDRINATE (DRAMAMINE) 50 mg tablet   No No   Sig: Take 0 5 tablets (25 mg total) by mouth every 6 (six) hours as needed for movement disorders (dizziness)   levothyroxine 100 mcg tablet   Yes No   Sig: Take 137 mcg by mouth daily    levothyroxine 100 mcg tablet   Yes No   Sig: Take 137 mcg by mouth daily   levothyroxine 125 mcg tablet   Yes No   Sig: Take 125 mcg by mouth daily   meclizine (ANTIVERT) 25 mg tablet   Yes No   Sig: Take 25 mg by mouth 3 (three) times a day as needed for dizziness   metoprolol tartrate (LOPRESSOR) 50 mg tablet   Yes No   Sig: Take 25 mg by mouth daily   metoprolol tartrate (LOPRESSOR) 50 mg tablet   Yes No   Sig: Take 50 mg by mouth daily   metoprolol tartrate (LOPRESSOR) 50 mg tablet   Yes No   Sig: Take 50 mg by mouth every 12 (twelve) hours   multivitamin (THERAGRAN) TABS   No No   Sig: TAKE 1 TABLET BY MOUTH DAILY     multivitamin (THERAGRAN) TABS   Yes No   Sig: Take 1 tablet by mouth daily   senna-docusate sodium (SENOKOT S) 8 6-50 mg per tablet   Yes No   Sig: Take 1 tablet by mouth daily   senna-docusate sodium (SENOKOT-S) 8 6-50 mg per tablet   No No   Sig: Take 1 tablet by mouth daily as needed for constipation   sertraline (ZOLOFT) 100 mg tablet   Yes No   Sig: Take 50 mg by mouth daily   sertraline (ZOLOFT) 25 mg tablet   Yes No   Sig: Take 25 mg by mouth daily      Facility-Administered Medications: None       Past Medical History:   Diagnosis Date    Anxiety     Benign positional vertigo     Breast cancer, right (HCC)     Dr Kristie CHERRY    Chronic venous insufficiency     Depression     [facility] [address] [facility] 2013    HTN (hypertension), benign     Hypertension     Hypothyroidism     Left knee DJD     [facility]     Nontoxic uninodular goiter     Right knee DJD        Past Surgical History:   Procedure Laterality Date    INTERTROCHANTERIC HIP FRACTURE SURGERY  2013       History reviewed  No pertinent family history  I have reviewed and agree with the history as documented  Social History     Tobacco Use    Smoking status: Never Smoker    Smokeless tobacco: Never Used   Substance Use Topics    Alcohol use: Not Currently    Drug use: Not Currently        Review of Systems   Unable to perform ROS: Dementia       Physical Exam  ED Triage Vitals [02/22/20 0805]   Temperature Pulse Respirations Blood Pressure SpO2   97 9 °F (36 6 °C) 75 18 (!) 219/93 97 %      Temp Source Heart Rate Source Patient Position - Orthostatic VS BP Location FiO2 (%)   Oral -- -- -- --      Pain Score       --             Orthostatic Vital Signs  Vitals:    02/22/20 0805 02/22/20 0820 02/22/20 0830   BP: (!) 219/93 (!) 206/92 (!) 186/84   Pulse: 75 70 70       Physical Exam   Constitutional: She appears well-developed and well-nourished  No distress  HENT:   Head: Normocephalic and atraumatic  Eyes: Pupils are equal, round, and reactive to light  EOM are normal    Neck: Normal range of motion  Neck supple  Cardiovascular: Normal rate, regular rhythm and normal heart sounds  Pulmonary/Chest: Effort normal and breath sounds normal  No respiratory distress  Abdominal: Soft  Bowel sounds are normal  There is no tenderness  Musculoskeletal: Normal range of motion          Right shoulder: Normal         Left shoulder: Normal         Right hip: Normal         Left hip: Normal         Right knee: Normal         Left knee: Normal         Cervical back: She exhibits no tenderness  Thoracic back: She exhibits no tenderness  Lumbar back: She exhibits no tenderness  Neurological: She is alert  She has normal strength  She is disoriented  She displays tremor  Skin: Skin is warm and dry  Psychiatric: She has a normal mood and affect  Nursing note and vitals reviewed  ED Medications  Medications - No data to display    Diagnostic Studies  Results Reviewed     None                 TRAUMA - CT head wo contrast   Final Result by Alonzo Strange MD (02/22 0305)      No acute intracranial abnormality  Microangiopathic changes  I personally discussed this study with Jovana Carcamo on 2/22/2020 at 8:21 AM                Workstation performed: [id number]         TRAUMA - CT spine cervical wo contrast   Final Result by Alonzo Strange MD (02/22 7556)      No cervical spine fracture or traumatic malalignment  Incidental thyroid nodule(s) for which nonemergent thyroid ultrasound is recommended  I personally discussed this study with Jovana Carcamo on 2/22/2020 at 8:21 AM                Workstation performed: [id number]               Procedures  Procedures      ED Course           Identification of Seniors at Risk      Most Recent Value   (ISAR) Identification of Seniors at Risk   Before the illness or injury that brought you to the Emergency, did you need someone to help you on a regular basis? 1 Filed at: 02/22/2020 9003   In the last 24 hours, have you needed more help than usual?  1 Filed at: 02/22/2020 3027   Have you been hospitalized for one or more nights during the past 6 months? 1 Filed at: 02/22/2020 3011   In general, do you see well? 1 Filed at: 02/22/2020 3328   In general, do you have serious problems with your memory?   1 Filed at: 02/22/2020 4515 Do you take more than three different medications every day? 1 Filed at: 02/22/2020 8500   ISAR Score  6 Filed at: 02/22/2020 5204                          MDM  Number of Diagnoses or Management Options  Accidental fall from bed, initial encounter:   Minor head injury, initial encounter:   Diagnosis management comments: Elderly female with dementia of following a fall, noted to have a hematoma on her forehead and disorientation that is reported to be at baseline; no other apparent injury or other complaints  CT head and cervical spine are negative  Discharge back to care of nursing home  Disposition  Final diagnoses:   Accidental fall from bed, initial encounter   Minor head injury, initial encounter     Time reflects when diagnosis was documented in both MDM as applicable and the Disposition within this note     Time User Action Codes Description Comment    2/22/2020  8:22 AM Ritika Henry Add [W06  XXXA] Accidental fall from bed, initial encounter     2/22/2020  8:23 AM Lopez Jaime Add [[id number]] Minor head injury, initial encounter       ED Disposition     ED Disposition Condition Date/Time Comment    Discharge Stable Sat Feb 22, 2020  8:21 AM Rohan Anthony discharge to home/self care  Follow-up Information     Follow up With Specialties Details Why Contact Info Additional [facility], [facility], Nurse Practitioner In 1 day  [address] [place] [address] [phone]       [address] [place] [address] Emergency Department Emergency Medicine  As needed, If symptoms worsen [address]  [phone]  ED, [address] [place] [address], [address] [phone]          Patient's Medications   Discharge Prescriptions    No medications on file     No discharge procedures on file  ED Provider  Attending physically available and evaluated Rohan Anthony I managed the patient along with the ED Attending      Electronically Signed by         Olga Lidia Tellez MD  02/22/20 5855

## 2020-03-06 ENCOUNTER — APPOINTMENT (EMERGENCY)
Dept: RADIOLOGY | Facility: HOSPITAL | Age: 85
End: 2020-03-06
Payer: MEDICARE

## 2020-03-06 ENCOUNTER — HOSPITAL ENCOUNTER (EMERGENCY)
Facility: HOSPITAL | Age: 85
Discharge: HOME | End: 2020-03-06
Attending: EMERGENCY MEDICINE | Admitting: EMERGENCY MEDICINE
Payer: MEDICARE

## 2020-03-06 VITALS
HEART RATE: 62 BPM | TEMPERATURE: 98.8 F | SYSTOLIC BLOOD PRESSURE: 148 MMHG | DIASTOLIC BLOOD PRESSURE: 80 MMHG | OXYGEN SATURATION: 95 % | RESPIRATION RATE: 16 BRPM
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
## 2020-03-06 DIAGNOSIS — M25.562 LEFT KNEE PAIN: ICD-10-CM

## 2020-03-06 PROCEDURE — 70450 CT HEAD/BRAIN W/O DYE: CPT

## 2020-03-06 PROCEDURE — 99284 EMERGENCY DEPT VISIT MOD MDM: CPT | Performed by: EMERGENCY MEDICINE

## 2020-03-06 PROCEDURE — 73560 X-RAY EXAM OF KNEE 1 OR 2: CPT

## 2020-03-06 PROCEDURE — 99284 EMERGENCY DEPT VISIT MOD MDM: CPT

## 2020-03-06 PROCEDURE — 72125 CT NECK SPINE W/O DYE: CPT

## 2020-03-06 RX ORDER — ACETAMINOPHEN 325 MG/1
650 TABLET ORAL ONCE
Status: COMPLETED | OUTPATIENT
Start: 2020-03-06 | End: 2020-03-06

## 2020-03-06 RX ADMIN — ACETAMINOPHEN 650 MG: 325 TABLET ORAL at 16:05

## 2020-03-06 NOTE — ED ATTENDING ATTESTATION
3/6/2020  IJhoan MD, saw and evaluated the patient  I have discussed the patient with the resident/non-physician practitioner and agree with the resident's/non-physician practitioner's findings, Plan of Care, and MDM as documented in the resident's/non-physician practitioner's note, except where noted  All available labs and Radiology studies were reviewed  I was present for key portions of any procedure(s) performed by the resident/non-physician practitioner and I was immediately available to provide assistance  At this point I agree with the current assessment done in the Emergency Department  I have conducted an independent evaluation of this patient a history and physical is as follows:    ED Course         Critical Care Time  Procedures     79 yo female from [address] [place] [address], found down, on asa  Pt unable to give full hx secondary to dementia but at baseline mental status per staff  Pt with no complaints  Vss, afebrile, lungs cta, rrr, abdomen soft nontender, no focal neuro deficits, left knee eccymosis, tenderness  Ct head, cspine, xray knee   Pt is trauma alert c

## 2020-03-06 NOTE — ED PROVIDER NOTES
H&P Exam - Trauma   Rohan Anthony 80 y o  female MRN: [id number]  Unit/Bed#: Z2HB Encounter: [id number]        Assessment/Plan   Trauma Alert: Other Level C  Model of Arrival: Ambulance  Trauma Team: Attending Siddhartha Leiva and Residents Merritt Jose  Consultants: None    Trauma Active Problems:   Fall on aspirin  Left knee pain    Trauma Plan:   CT head/Cspine  Left knee xray  If all negative, discharge back to nursing home    Chief Complaint: "My knee hurts "     History of Present Illness   HPI:  Rohan Anthony is a 80 y o  female who presents from [facility] s/p unwitnessed fall  Per EMS, patient had an unwitnessed fall and was found on the ground next to her bed  Unknown LOC  Patient is on a daily baby aspirin  Patient provides limited history, says that she just fell  She currently complains of left knee pain  She denies any headache lightheadedness, dizziness, neck pain pain, shortness of breath, nausea, vomiting, pain, back pain, extremity weakness or numbness/tingling  Per EMS, nursing staff noted patient is at her baseline mental status and has limited ambulation on her own, mainly uses wheelchair  Mechanism:Fall    Review of Systems   Constitutional: Positive for chills  Negative for diaphoresis, fatigue and fever  HENT: Negative for congestion, rhinorrhea and sore throat  Eyes: Negative for photophobia and visual disturbance  Respiratory: Negative for cough, chest tightness and shortness of breath  Cardiovascular: Negative for chest pain and palpitations  Gastrointestinal: Negative for abdominal pain, blood in stool, constipation, diarrhea, nausea and vomiting  Genitourinary: Negative for dysuria, frequency and hematuria  Musculoskeletal: Positive for arthralgias  Negative for back pain, gait problem, myalgias, neck pain and neck stiffness  Skin: Negative for pallor and rash  Neurological: Negative for weakness, light-headedness, numbness and headaches     Hematological: Negative for adenopathy  Does not bruise/bleed easily  All other systems reviewed and are negative  12-point, complete review of systems was reviewed and negative except as stated above  Historical Information      Past Medical History:   Diagnosis Date    Anxiety     Benign positional vertigo     Breast cancer, right (HCC)     Dr Sarah CHERRY Chronic venous insufficiency     Depression     [facility] [address] [facility] 2013    HTN (hypertension), benign     Hypertension     Hypothyroidism     Left knee DJD     Malignant ne[facility] of right [facility] [address]     Nontoxic uninodular goiter     Right knee DJD      Past Surgical History:   Procedure Laterality Date    INTERTROCHANTERIC HIP FRACTURE SURGERY  2013     Social History   Social History     Substance and Sexual Activity   Alcohol Use Not Currently     Social History     Substance and Sexual Activity   Drug Use Not Currently     Social History     Tobacco Use   Smoking Status Never Smoker   Smokeless Tobacco Never Used     E-Cigarette/Vaping     E-Cigarette/Vaping Substances     Immunization History   Administered Date(s) Administered    Tdap 10/31/2019     Last Tetanus: 10/31/2019      Meds/Allergies   all current active meds have been reviewed    Allergies   Allergen Reactions    Sulindac      Unknown reaction and severity   Documented with note from previous PCP    Sulindac        PHYSICAL EXAM    Objective   Vitals:   First set: Temperature: 98 8 °F (37 1 °C) (03/06/20 1505)  Pulse: 65 (03/06/20 1505)  Respirations: 18 (03/06/20 1505)  Blood Pressure: (!) 179/85 (03/06/20 1505)    Primary Survey:   (A) Airway: intact  (B) Breathing: CTAB  (C) Circulation: Pulses:   normal, carotid  2/4, pedal  2/4, radial  2/4 and femoral  2/4  (D) Disabliity:  GCS Total:  14, Eye Opening:   Spontaneous = 4, Motor Response: Obeys commands = 6 and Verbal Response:  Confused = 4  (E) Expose:  Completed    Secondary Survey: (Click on Physical Exam tab above)  Physical Exam   Constitutional: No distress  Alert and oriented to person and birth date   HENT:   Head: Atraumatic  Mouth/Throat: Oropharynx is clear and moist  No oropharyngeal exudate  No obvious signs of head trauma   Eyes: Pupils are equal, round, and reactive to light  Conjunctivae and EOM are normal    Neck: Normal range of motion  Neck supple  No C-spine tenderness to palpation   Cardiovascular: Normal rate, regular rhythm, normal heart sounds and intact distal pulses  Pulmonary/Chest: Effort normal and breath sounds normal  No respiratory distress  She exhibits no tenderness  Abdominal: Soft  Bowel sounds are normal  She exhibits no distension  There is no tenderness  There is no guarding  Musculoskeletal: Normal range of motion  Neurological: She is alert  No facial asymmetry noted, CN 2-12 intact, appropriate ROM of upper and lower extremities, muscle strength 5/5 throughout   Skin: Skin is warm and dry  Capillary refill takes less than 2 seconds  No rash noted  She is not diaphoretic  No erythema  No pallor  Psychiatric: She has a normal mood and affect  Her behavior is normal  Judgment and thought content normal    Nursing note and vitals reviewed        Invasive Devices     Peripheral Intravenous Line            Peripheral IV 12/04/19 Left Forearm 94 days                Imaging/EKG Studies: CT Scan Head: negative, CT Scan C-Spine: negative      Code Status: No Order  Advance Directive and Living Will: Yes    Power of : Yes  POLST:           Gera Blood, DO  03/07/20 2054

## 2020-03-15 ENCOUNTER — HOSPITAL ENCOUNTER (OUTPATIENT)
Facility: HOSPITAL | Age: 85
Setting detail: OBSERVATION
Discharge: HOSPICE | End: 2020-03-16
Attending: EMERGENCY MEDICINE | Admitting: INTERNAL MEDICINE
Payer: MEDICARE

## 2020-03-15 ENCOUNTER — APPOINTMENT (EMERGENCY)
Dept: RADIOLOGY | Facility: HOSPITAL | Age: 85
End: 2020-03-15
Payer: MEDICARE

## 2020-03-15 VITALS
DIASTOLIC BLOOD PRESSURE: 76 MMHG | OXYGEN SATURATION: 98 % | HEART RATE: 68 BPM | SYSTOLIC BLOOD PRESSURE: 156 MMHG | RESPIRATION RATE: 18 BRPM | TEMPERATURE: 96.4 F

## 2020-03-15 DIAGNOSIS — Z51.5 PALLIATIVE CARE PATIENT: ICD-10-CM

## 2020-03-15 DIAGNOSIS — S72.142A CLOSED COMMINUTED INTERTROCHANTERIC FRACTURE OF LEFT FEMUR, INITIAL ENCOUNTER (HCC): Primary | ICD-10-CM

## 2020-03-15 DIAGNOSIS — Z87.81 S/P LEFT HIP FRACTURE: ICD-10-CM

## 2020-03-15 PROBLEM — E03.9 HYPOTHYROIDISM: Status: ACTIVE | Noted: 2020-03-15

## 2020-03-15 PROBLEM — F32.A DEPRESSION: Status: ACTIVE | Noted: 2020-03-15

## 2020-03-15 PROBLEM — F03.90 DEMENTIA (HCC): Status: ACTIVE | Noted: 2020-03-15

## 2020-03-15 LAB
ABO GROUP BLD: NORMAL
ALBUMIN SERPL BCP-MCNC: 2.8 G/DL (ref 3.5–5)
ALP SERPL-CCNC: 74 U/L (ref 46–116)
ALT SERPL W P-5'-P-CCNC: 20 U/L (ref 12–78)
ANION GAP SERPL CALCULATED.3IONS-SCNC: 4 MMOL/L (ref 4–13)
AST SERPL W P-5'-P-CCNC: 37 U/L (ref 5–45)
BASOPHILS # BLD AUTO: 0.06 THOUSANDS/ΜL (ref 0–0.1)
BASOPHILS NFR BLD AUTO: 1 % (ref 0–1)
BILIRUB SERPL-MCNC: 0.59 MG/DL (ref 0.2–1)
BLD GP AB SCN SERPL QL: NEGATIVE
BUN SERPL-MCNC: 39 MG/DL (ref 5–25)
CALCIUM SERPL-MCNC: 9.1 MG/DL (ref 8.3–10.1)
CHLORIDE SERPL-SCNC: 112 MMOL/L (ref 100–108)
CO2 SERPL-SCNC: 30 MMOL/L (ref 21–32)
CREAT SERPL-MCNC: 0.73 MG/DL (ref 0.6–1.3)
EOSINOPHIL # BLD AUTO: 0.38 THOUSAND/ΜL (ref 0–0.61)
EOSINOPHIL NFR BLD AUTO: 5 % (ref 0–6)
ERYTHROCYTE [DISTWIDTH] IN BLOOD BY AUTOMATED COUNT: 13.2 % (ref 11.6–15.1)
GFR SERPL CREATININE-BSD FRML MDRD: 73 ML/MIN/1.73SQ M
GLUCOSE SERPL-MCNC: 97 MG/DL (ref 65–140)
HCT VFR BLD AUTO: 36.1 % (ref 34.8–46.1)
HGB BLD-MCNC: 11.6 G/DL (ref 11.5–15.4)
IMM GRANULOCYTES # BLD AUTO: 0.03 THOUSAND/UL (ref 0–0.2)
IMM GRANULOCYTES NFR BLD AUTO: 0 % (ref 0–2)
LYMPHOCYTES # BLD AUTO: 1.72 THOUSANDS/ΜL (ref 0.6–4.47)
LYMPHOCYTES NFR BLD AUTO: 23 % (ref 14–44)
MCH RBC QN AUTO: 32.2 PG (ref 26.8–34.3)
MCHC RBC AUTO-ENTMCNC: 32.1 G/DL (ref 31.4–37.4)
MCV RBC AUTO: 100 FL (ref 82–98)
MONOCYTES # BLD AUTO: 0.69 THOUSAND/ΜL (ref 0.17–1.22)
MONOCYTES NFR BLD AUTO: 9 % (ref 4–12)
NEUTROPHILS # BLD AUTO: 4.76 THOUSANDS/ΜL (ref 1.85–7.62)
NEUTS SEG NFR BLD AUTO: 62 % (ref 43–75)
NRBC BLD AUTO-RTO: 0 /100 WBCS
PLATELET # BLD AUTO: 315 THOUSANDS/UL (ref 149–390)
PMV BLD AUTO: 9.9 FL (ref 8.9–12.7)
POTASSIUM SERPL-SCNC: 3.8 MMOL/L (ref 3.5–5.3)
PROT SERPL-MCNC: 6.4 G/DL (ref 6.4–8.2)
RBC # BLD AUTO: 3.6 MILLION/UL (ref 3.81–5.12)
RH BLD: NEGATIVE
SODIUM SERPL-SCNC: 146 MMOL/L (ref 136–145)
SPECIMEN EXPIRATION DATE: NORMAL
WBC # BLD AUTO: 7.64 THOUSAND/UL (ref 4.31–10.16)

## 2020-03-15 PROCEDURE — 99285 EMERGENCY DEPT VISIT HI MDM: CPT | Performed by: EMERGENCY MEDICINE

## 2020-03-15 PROCEDURE — 36415 COLL VENOUS BLD VENIPUNCTURE: CPT | Performed by: EMERGENCY MEDICINE

## 2020-03-15 PROCEDURE — 86901 BLOOD TYPING SEROLOGIC RH(D): CPT | Performed by: EMERGENCY MEDICINE

## 2020-03-15 PROCEDURE — 80053 COMPREHEN METABOLIC PANEL: CPT | Performed by: EMERGENCY MEDICINE

## 2020-03-15 PROCEDURE — 86900 BLOOD TYPING SEROLOGIC ABO: CPT | Performed by: EMERGENCY MEDICINE

## 2020-03-15 PROCEDURE — 87081 CULTURE SCREEN ONLY: CPT | Performed by: INTERNAL MEDICINE

## 2020-03-15 PROCEDURE — 85025 COMPLETE CBC W/AUTO DIFF WBC: CPT | Performed by: EMERGENCY MEDICINE

## 2020-03-15 PROCEDURE — 99284 EMERGENCY DEPT VISIT MOD MDM: CPT

## 2020-03-15 PROCEDURE — 86850 RBC ANTIBODY SCREEN: CPT | Performed by: EMERGENCY MEDICINE

## 2020-03-15 PROCEDURE — 73502 X-RAY EXAM HIP UNI 2-3 VIEWS: CPT

## 2020-03-15 RX ORDER — LEVOTHYROXINE SODIUM 88 UG/1
88 TABLET ORAL
Status: DISCONTINUED | OUTPATIENT
Start: 2020-03-16 | End: 2020-03-16 | Stop reason: HOSPADM

## 2020-03-15 RX ORDER — ONDANSETRON 2 MG/ML
4 INJECTION INTRAMUSCULAR; INTRAVENOUS EVERY 6 HOURS PRN
Status: DISCONTINUED | OUTPATIENT
Start: 2020-03-15 | End: 2020-03-16 | Stop reason: HOSPADM

## 2020-03-15 RX ORDER — ALPRAZOLAM 0.25 MG/1
0.25 TABLET ORAL DAILY
Status: DISCONTINUED | OUTPATIENT
Start: 2020-03-16 | End: 2020-03-16 | Stop reason: HOSPADM

## 2020-03-15 RX ORDER — SERTRALINE HYDROCHLORIDE 25 MG/1
25 TABLET, FILM COATED ORAL DAILY
Status: DISCONTINUED | OUTPATIENT
Start: 2020-03-16 | End: 2020-03-16 | Stop reason: HOSPADM

## 2020-03-15 RX ORDER — ACETAMINOPHEN 325 MG/1
500 TABLET ORAL 3 TIMES DAILY
Status: DISCONTINUED | OUTPATIENT
Start: 2020-03-15 | End: 2020-03-16 | Stop reason: HOSPADM

## 2020-03-15 RX ORDER — METOPROLOL TARTRATE 50 MG/1
50 TABLET, FILM COATED ORAL DAILY
Status: DISCONTINUED | OUTPATIENT
Start: 2020-03-16 | End: 2020-03-16 | Stop reason: HOSPADM

## 2020-03-15 RX ORDER — AMOXICILLIN 250 MG
1 CAPSULE ORAL DAILY
Status: DISCONTINUED | OUTPATIENT
Start: 2020-03-16 | End: 2020-03-16 | Stop reason: HOSPADM

## 2020-03-15 RX ORDER — MECLIZINE HYDROCHLORIDE 25 MG/1
25 TABLET ORAL EVERY 8 HOURS PRN
Status: DISCONTINUED | OUTPATIENT
Start: 2020-03-15 | End: 2020-03-16 | Stop reason: HOSPADM

## 2020-03-15 RX ORDER — LACTOSE-REDUCED FOOD
1 LIQUID (ML) ORAL DAILY
Status: DISCONTINUED | OUTPATIENT
Start: 2020-03-16 | End: 2020-03-15

## 2020-03-15 RX ADMIN — MORPHINE SULFATE 0.5 MG: 2 INJECTION, SOLUTION INTRAMUSCULAR; INTRAVENOUS at 22:43

## 2020-03-15 RX ADMIN — ACETAMINOPHEN 488 MG: 325 TABLET ORAL at 22:52

## 2020-03-15 NOTE — PLAN OF CARE
Problem: Potential for Falls  Goal: Patient will remain free of falls  Description  INTERVENTIONS:  - Assess patient frequently for physical needs  -  Identify cognitive and physical deficits and behaviors that affect risk of falls    -  [place] fall precautions as indicated by assessment   - Educate patient/family on patient safety including physical limitations  - Instruct patient to call for assistance with activity based on assessment  - Modify environment to reduce risk of injury  - Consider OT/PT consult to assist with strengthening/mobility  Outcome: Progressing     Problem: Prexisting or High Potential for Compromised Skin Integrity  Goal: Skin integrity is maintained or improved  Description  INTERVENTIONS:  - Identify patients at risk for skin breakdown  - Assess and monitor skin integrity  - Assess and monitor nutrition and hydration status  - Monitor labs   - Assess for incontinence   - Turn and reposition patient  - Assist with mobility/ambulation  - Relieve pressure over bony prominences  - Avoid friction and shearing  - Provide appropriate hygiene as needed including keeping skin clean and dry  - Evaluate need for skin moisturizer/barrier cream  - Collaborate with interdisciplinary team   - Patient/family teaching  - Consider wound care consult   Outcome: Progressing     Problem: PAIN - ADULT  Goal: Verbalizes/displays adequate comfort level or baseline comfort level  Description  Interventions:  - Encourage patient to monitor pain and request assistance  - Assess pain using appropriate pain scale  - Administer analgesics based on type and severity of pain and evaluate response  - Implement non-pharmacological measures as appropriate and evaluate response  - Consider cultural and social influences on pain and pain management  - Notify physician/advanced practitioner if interventions unsuccessful or patient reports new pain  Outcome: Progressing     Problem: SAFETY ADULT  Goal: Maintain or return to baseline ADL function  Description  INTERVENTIONS:  -  Assess patient's ability to carry out ADLs; assess patient's baseline for ADL function and identify physical deficits which impact ability to perform ADLs (bathing, care of mouth/teeth, toileting, grooming, dressing, etc )  - Assess/evaluate cause of self-care deficits   - Assess range of motion  - Assess patient's mobility; develop plan if impaired  - Assess patient's need for assistive devices and provide as appropriate  - Encourage maximum independence but intervene and supervise when necessary  - Involve family in performance of ADLs  - Assess for home care needs following discharge   - Consider OT consult to assist with ADL evaluation and planning for discharge  - Provide patient education as appropriate  Outcome: Progressing  Goal: Maintain or return mobility status to optimal level  Description  INTERVENTIONS:  - Assess patient's baseline mobility status (ambulation, transfers, stairs, etc )    - Identify cognitive and physical deficits and behaviors that affect mobility  - Identify mobility aids required to assist with transfers and/or ambulation (gait belt, sit-to-stand, lift, walker, cane, etc )  - [place] fall precautions as indicated by assessment  - Record patient progress and toleration of activity level on Mobility SBAR; progress patient to next Phase/Stage  - Instruct patient to call for assistance with activity based on assessment  - Consider rehabilitation consult to assist with strengthening/weightbearing, etc   Outcome: Progressing     Problem: DISCHARGE PLANNING  Goal: Discharge to home or other facility with appropriate resources  Description  INTERVENTIONS:  - Identify barriers to discharge w/patient and caregiver  - Arrange for needed discharge resources and transportation as appropriate  - Identify discharge learning needs (meds, wound care, etc )  - Arrange for interpretive services to assist at discharge as needed  - Refer to Case Management Department for coordinating discharge planning if the patient needs post-hospital services based on physician/advanced practitioner order or complex needs related to functional status, cognitive ability, or social support system  Outcome: Progressing

## 2020-03-15 NOTE — SOCIAL WORK
ELZA spoke to Arnoldo Louise with [facility]  He will be in tomorrow morning to interview pt  All equipment will be delivered to [facility] tomorrow by noon and [facility] is requesting that transportation be arranged for 1 PM   ELZA contacted Sarah Graff at [facility] and notified her of pt return tomorrow and hospice agency  Pt will require a physician note stating that pt does not have symptoms of Covid-19 prior to being d/c  Report is to be called to [phone] and fax to [phone]  ELZA arranged BLS transportation with [facility]  ELZA is to call [facility] tomorrow with pt room since pt had not been admitted yet and room was unknown

## 2020-03-15 NOTE — ASSESSMENT & PLAN NOTE
Patient with recent fall suffering hip fracture undetected until today  Family desires no surgical intervention  Wants hospice at current residence , which will be available in the am   For tonight comfort care with CM to assist transition to hospice in the am   · Pain management currently comfortable   Scheduled tylenol and PRN IV Morphine

## 2020-03-15 NOTE — ASSESSMENT & PLAN NOTE
Will discontinue none comfort oriented medications  Hospice is already working with the family    CM to assist in the am

## 2020-03-15 NOTE — SOCIAL WORK
CM was notified that pt's family is requesting Hospice at [facility]  CM spoke to Marita Castellon at [facility] and was informed of agencies that they use  CM met with pt's dtrs Del Barbosa ([phone]) and Howie Myles ([phone]) and discussed hospice recommendation  Reviewed list of providers given by [facility] and family chose 1st choice [facility], 2nd choice [facility]  CM made referrals in [facility] and [facility]   CM is awaiting call from on call admissions team

## 2020-03-15 NOTE — TRANSPORTATION MEDICAL NECESSITY
Section I - General Information    Name of Patient: Young Mack                 : 1930    Medicare #: [id number]  Transport Date: 3/16/2020 (PCS is valid for round trips on this date and for all repetitive trips in the 60-day range as noted below )  Origin: [address]: [facility]  Is the pt's stay covered under Medicare Part A (PPS/DRG)   [x]     Closest appropriate facility? If no, why is transport to more distant facility required? Yes  If hospice pt, is this transport related to pt's terminal illness? Yes       Section II - Medical Necessity Questionnaire  Ambulance transportation is medically necessary only if other means of transport are contraindicated or would be potentially harmful to the patient  To meet this requirement, the patient must either be "bed confined" or suffer from a condition such that transport by means other than ambulance is contraindicated by the patient's condition  The following questions must be answered by the medical professional signing below for this form to be valid:    1)  Describe the MEDICAL CONDITION (physical and/or mental) of this patient AT [address] [place] [address] that requires the patient to be transported in an ambulance and why transport by other means is contraindicated by the patient's condition:Fractured Hip and severe Dementia    2) Is the patient "bed confined" as defined below? YES  To be "be confined" the patient must satisfy all three of the following conditions: (1) unable to get up from bed without Assistance; AND (2) unable to ambulate; AND (3) unable to sit in a chair or wheelchair  3) Can this patient safely be transported by car or wheelchair van (i e , seated during transport without a medical attendant or monitoring)?    No    4) In addition to completing questions 1-3 above, please check any of the following conditions that apply Non healed Fractures, unable to sit for extended periods of time  *Note: supporting documentation for any boxes checked must be maintained in the patient's medical records  If hosp-hosp transfer, describe services needed at 2nd facility not available at 1st facility? Section III - Signature of Physician or Healthcare Professional  I certify that the above information is true and correct based on my evaluation of this patient, and represent that the patient requires transport by ambulance and that other forms of transport are contraindicated  I understand that this information will be used by the Centers for Medicare and Medicaid Services (CMS) to support the determination of medical necessity for ambulance services, and I represent that I have personal knowledge of the patient's condition at time of transport  []  If this box is checked, I also certify that the patient is physically or mentally incapable of signing the ambulance service's claim and that the institution with which I am affiliated has furnished care, services, or assistance to the patient  My signature below is made on behalf of the patient pursuant to 42 CFR §424 36(b)(4)  In accordance with 42 CFR §424 37, the specific reason(s) that the patient is physically or mentally incapable of signing the claim form is as follows: Severe Dementia  Signature of Physician* or Healthcare Professional______________________________________________________________  Signature Date 03/15/20 (For scheduled repetitive transports, this form is not valid for transports performed more than 60 days after this date)    Printed Name & Credentials of Physician or Healthcare Professional (MD, DO, RN, etc )________________________________  *Form must be signed by patient's attending physician for scheduled, repetitive transports   For non-repetitive, unscheduled ambulance transports, if unable to obtain the signature of the attending physician, any of the following may sign (choose appropriate option below)  [] Physician Assistant []  Clinical Nurse Specialist []  Registered Nurse  []  Nurse Practitioner  [x] Discharge Planner

## 2020-03-15 NOTE — ED ATTENDING ATTESTATION
3/15/2020  IIsadora MD, saw and evaluated the patient  I have discussed the patient with the resident/non-physician practitioner and agree with the resident's/non-physician practitioner's findings, Plan of Care, and MDM as documented in the resident's/non-physician practitioner's note, except where noted  All available labs and Radiology studies were reviewed  I was present for key portions of any procedure(s) performed by the resident/non-physician practitioner and I was immediately available to provide assistance  At this point I agree with the current assessment done in the Emergency Department  I have conducted an independent evaluation of this patient a history and physical is as follows:    ED Course     80year-old female, presenting to the emergency department for evaluation of fracture of the left hip  Patient had a fall on March 6, 2020  Patient was discharged back to the nursing facility in has continued to complain of pain in the left hip, and 3 days ago was seen in examined by physician who ordered a hip x-ray  Hip x-ray reportedly demonstrates inter trochanteric fracture, however no images are available for review  Patient is a poor historian secondary to underlying dimension provides no history  Patient is unable to provide review of systems secondary to underlying dementia  On examination elderly female sitting upright in the stretcher no acute respiratory distress  Head is normocephalic and atraumatic  Mucous membranes are dry  Chest is nontender  Heart is regular rate rhythm with no murmurs rubs or gallops  Lungs are clear  Abdomen is soft and nontender  The patient has pain with range of motion and palpation of the left hip  There is ecchymosis extending along the medial left thigh  Assessment and plan:  Fracture of the left hip  Plan is x-ray and ortho consult      Labs Reviewed   CBC AND DIFFERENTIAL - Abnormal       Result Value Ref Range Status    WBC 7  64  4 31 - 10 16 Thousand/uL Final    RBC 3 60 (*) 3 81 - 5 12 Million/uL Final    Hemoglobin 11 6  11 5 - 15 4 g/dL Final    Hematocrit 36 1  34 8 - 46 1 % Final     (*) 82 - 98 fL Final    MCH 32 2  26 8 - 34 3 pg Final    MCHC 32 1  31 4 - 37 4 g/dL Final    RDW 13 2  11 6 - 15 1 % Final    MPV 9 9  8 9 - 12 7 fL Final    Platelets 166  087 - 390 Thousands/uL Final    nRBC 0  /100 WBCs Final    Neutrophils Relative 62  43 - 75 % Final    Immat GRANS % 0  0 - 2 % Final    Lymphocytes Relative 23  14 - 44 % Final    Monocytes Relative 9  4 - 12 % Final    Eosinophils Relative 5  0 - 6 % Final    Basophils Relative 1  0 - 1 % Final    Neutrophils Absolute 4 76  1 85 - 7 62 Thousands/µL Final    Immature Grans Absolute 0 03  0 00 - 0 20 Thousand/uL Final    Lymphocytes Absolute 1 72  0 60 - 4 47 Thousands/µL Final    Monocytes Absolute 0 69  0 17 - 1 22 Thousand/µL Final    Eosinophils Absolute 0 38  0 00 - 0 61 Thousand/µL Final    Basophils Absolute 0 06  0 00 - 0 10 Thousands/µL Final   COMPREHENSIVE METABOLIC PANEL - Abnormal    Sodium 146 (*) 136 - 145 mmol/L Final    Potassium 3 8  3 5 - 5 3 mmol/L Final    Chloride 112 (*) 100 - 108 mmol/L Final    CO2 30  21 - 32 mmol/L Final    ANION GAP 4  4 - 13 mmol/L Final    BUN 39 (*) 5 - 25 mg/dL Final    Creatinine 0 73  0 60 - 1 30 mg/dL Final    Comment: Standardized to IDMS reference method    Glucose 97  65 - 140 mg/dL Final    Comment:   If the patient is fasting, the ADA then defines impaired fasting glucose as > 100 mg/dL and diabetes as > or equal to 123 mg/dL  Specimen collection should occur prior to Sulfasalazine administration due to the potential for falsely depressed results  Specimen collection should occur prior to Sulfapyridine administration due to the potential for falsely elevated results      Calcium 9 1  8 3 - 10 1 mg/dL Final    AST 37  5 - 45 U/L Final    Comment:   Specimen collection should occur prior to Sulfasalazine administration due to the potential for falsely depressed results  ALT 20  12 - 78 U/L Final    Comment:   Specimen collection should occur prior to Sulfasalazine and/or Sulfapyridine administration due to the potential for falsely depressed results  Alkaline Phosphatase 74  46 - 116 U/L Final    Total Protein 6 4  6 4 - 8 2 g/dL Final    Albumin 2 8 (*) 3 5 - 5 0 g/dL Final    Total Bilirubin 0 59  0 20 - 1 00 mg/dL Final    eGFR 73  ml/min/1 73sq m Final    Narrative:     [place] guidelines for Chronic Kidney Disease (CKD):     Stage 1 with normal or high GFR (GFR > 90 mL/min/1 73 square meters)    Stage 2 Mild CKD (GFR = 60-89 mL/min/1 73 square meters)    Stage 3A Moderate CKD (GFR = 45-59 mL/min/1 73 square meters)    Stage 3B Moderate CKD (GFR = 30-44 mL/min/1 73 square meters)    Stage 4 Severe CKD (GFR = 15-29 mL/min/1 73 square meters)    Stage 5 End Stage CKD (GFR <15 mL/min/1 73 square meters)  Note: GFR calculation is accurate only with a steady state creatinine   TYPE AND SCREEN    ABO Grouping A   Final    Rh Factor Negative   Final    Antibody Screen Negative   Final    Specimen Expiration Date [id number]   Final   ABORH RECHECK       XR hip/pelv 2-3 vws left   ED Interpretation   Comminuted intertrochanteric fracture                Critical Care Time  Procedures

## 2020-03-15 NOTE — ED PROVIDER NOTES
ASSESSMENT AND PLAN    Greer Chambers is a 80 y o  female with a history of severe dementia who presents for evaluation of a left-sided intertrochanteric fracture, as described below  Based on family request, the patient will be hospice level care, and no further intervention is desired  The patient has severe dementia, and altered medical decision making is facilitated via her daughters   -discussed with the hospice liaison  Unfortunately, as the patient resides a [facility], she will not be able to receive hospice services at the nursing facility tonight  For this reason, the patient be admitted to the medicine team for further management  Discussed in detail with the admitting medicine team    History  Chief Complaint   Patient presents with    Hip Pain     Pt diagnosed with L hip fracture on 3/12  Sent for re-evaluation due to pain  HPI this is a 51-year-old female who presents for evaluation of left knee pain  The patient was recently a trauma alert in the emergency department, and had a CT scan of the brain because of chronic aspirin use, as well as a left knee x-ray due to pain and ecchymosis, all which were negative, and was discharged home  Unfortunate, the patient has a history of severe dementia, so history is quite limited  Apparently, she had been having persistent left knee pain at the nursing home, and eventually had a left hip x-ray obtained, which displayed a comminuted intertrochanteric fracture  For this reason she was brought back to the emergency department today  Per family, the patient has a longstanding history of dementia  Family is currently pursuing hospice care, and does not want any surgical intervention  The patient currently offers no complaints    Prior to Admission Medications   Prescriptions Last Dose Informant Patient Reported? Taking? ALPRAZolam (XANAX) 0 25 mg tablet   No No   Sig: TAKE 1 TABLET BY MOUTH DAILY     Patient taking differently: 2 (two) times a day    ALPRAZolam (XANAX) 0 25 mg tablet   Yes No   Sig: Take 0 25 mg by mouth daily   ALPRAZolam (XANAX) 0 25 mg tablet   Yes No   Sig: Take 0 25 mg by mouth daily as needed for anxiety   ALPRAZolam (XANAX) 0 25 mg tablet   Yes No   Sig: Take 0 25 mg by mouth daily   Calcium 600-400 MG-UNIT CHEW   Yes No   Sig: Chew   Calcium Carb-Cholecalciferol 500-400 MG-UNIT TABS   Yes No   Sig: Take 1 tablet by mouth daily   Calcium Carbonate-Vitamin D 600-400 MG-UNIT per tablet   No No   Sig: TAKE 1 TABLET BY MOUTH ONCE DAILY     Calcium-Vitamin D ([facility] CALCIUM-600/VIT D PO)   Yes No   Sig: Take 1 tablet by mouth daily   MAPAP 500 MG tablet   No No   Sig: TAKE 1 TABLET BY MOUTH EVERY 6 HOURS AS NEEDED   Meclizine HCl (MEDI-MECLIZINE PO)   Yes No   Sig: Take 25 mg by mouth every 6 (six) hours as needed   Menthol, Topical Analgesic, (BIOFREEZE) 4 % GEL   Yes No   Sig: Apply topically daily   Multiple Vitamin (MULTIVITAMIN) tablet   Yes No   Sig: Take 1 tablet by mouth daily   Nutritional Supplements (ENSURE)   Yes No   Sig: Take 1 Can by mouth daily   Sennosides-Docusate Sodium (SENNALAX-S PO)   Yes No   Sig: Take 1 tablet by mouth daily as needed   acetaminophen (TYLENOL) 500 mg tablet   Yes No   Sig: Take 500 mg by mouth 3 (three) times a day   acetaminophen (TYLENOL) 500 mg tablet   Yes No   Sig: Take 500 mg by mouth every 6 (six) hours as needed for mild pain   aspirin (ECOTRIN LOW STRENGTH) 81 mg EC tablet   Yes No   Sig: Take 81 mg by mouth daily   aspirin 81 mg chewable tablet   Yes No   Sig: Chew 81 mg daily   aspirin 81 mg chewable tablet   Yes No   Sig: Chew 81 mg daily   calcium carbonate (TUMS) 500 mg chewable tablet   No No   Sig: Chew 1 tablet (500 mg total) every 4 (four) hours as needed for indigestion or heartburn   calcium carbonate (TUMS) 500 mg chewable tablet   Yes No   Sig: Chew 1 tablet every 4 (four) hours as needed for indigestion or heartburn   dimenhyDRINATE (DRAMAMINE) 50 mg tablet   No No Sig: Take 0 5 tablets (25 mg total) by mouth every 6 (six) hours as needed for movement disorders (dizziness)   levothyroxine 100 mcg tablet   Yes No   Sig: Take 88 mcg by mouth daily    levothyroxine 100 mcg tablet   Yes No   Sig: Take 137 mcg by mouth daily   levothyroxine 125 mcg tablet   Yes No   Sig: Take 125 mcg by mouth daily   meclizine (ANTIVERT) 25 mg tablet   Yes No   Sig: Take 25 mg by mouth 3 (three) times a day as needed for dizziness   metoprolol tartrate (LOPRESSOR) 50 mg tablet   Yes No   Sig: Take 25 mg by mouth daily   metoprolol tartrate (LOPRESSOR) 50 mg tablet   Yes No   Sig: Take 50 mg by mouth daily   metoprolol tartrate (LOPRESSOR) 50 mg tablet   Yes No   Sig: Take 50 mg by mouth every 12 (twelve) hours   multivitamin (THERAGRAN) TABS   No No   Sig: TAKE 1 TABLET BY MOUTH DAILY  multivitamin (THERAGRAN) TABS   Yes No   Sig: Take 1 tablet by mouth daily   senna-docusate sodium (SENOKOT S) 8 6-50 mg per tablet   Yes No   Sig: Take 1 tablet by mouth daily   senna-docusate sodium (SENOKOT-S) 8 6-50 mg per tablet   No No   Sig: Take 1 tablet by mouth daily as needed for constipation   sertraline (ZOLOFT) 100 mg tablet   Yes No   Sig: Take 50 mg by mouth daily   sertraline (ZOLOFT) 25 mg tablet   Yes No   Sig: Take 25 mg by mouth daily      Facility-Administered Medications: None       Past Medical History:   Diagnosis Date    Anxiety     Benign positional vertigo     Breast cancer, right (HCC)     Dr Mateusz CHERRY    Chronic venous insufficiency     Depression     Hip fracture ([facility] ) 2013    HTN (hypertension), benign     Hypertension     Hypothyroidism     Left knee DJD     Malignant neoplasm of right breast ([facility] [address] )     Nontoxic uninodular goiter     Right knee DJD        Past Surgical History:   Procedure Laterality Date    INTERTROCHANTERIC HIP FRACTURE SURGERY  2013       History reviewed  No pertinent family history    I have reviewed and agree with the history as documented  E-Cigarette/Vaping     E-Cigarette/Vaping Substances     Social History     Tobacco Use    Smoking status: Never Smoker    Smokeless tobacco: Never Used   Substance Use Topics    Alcohol use: Not Currently    Drug use: Not Currently        Review of Systems   Unable to perform ROS: Dementia       Physical Exam  ED Triage Vitals   Temperature Pulse Respirations Blood Pressure SpO2   03/15/20 1232 03/15/20 1232 03/15/20 1232 03/15/20 1232 03/15/20 1232   98 4 °F (36 9 °C) 66 18 162/92 96 %      Temp Source Heart Rate Source Patient Position - Orthostatic VS BP Location FiO2 (%)   03/15/20 1232 03/15/20 1232 03/15/20 1232 03/15/20 1232 --   Oral Monitor Lying Right arm       Pain Score       03/15/20 1700       No Pain             Orthostatic Vital Signs  Vitals:    03/15/20 1232 03/15/20 1328 03/15/20 1445 03/15/20 1631   BP: 162/92 141/83 164/87 156/76   Pulse: 66 70 77 68   Patient Position - Orthostatic VS: Lying Lying Lying        Physical Exam   Constitutional:   Slightly somnolent, but easily arousable to voice, alert  , no acute distress  Nontoxic in appearance  Pleasantly demented   HENT:   Head: Normocephalic and atraumatic  Mouth/Throat: Oropharynx is clear and moist  No oropharyngeal exudate  Eyes: Pupils are equal, round, and reactive to light  EOM are normal  Right eye exhibits no discharge  Left eye exhibits no discharge  No scleral icterus  Neck: Normal range of motion  Neck supple  No JVD present  No tracheal deviation present  Cardiovascular: Normal rate, regular rhythm and normal heart sounds  No murmur heard  Pulmonary/Chest: Effort normal  No stridor  No respiratory distress  She has no wheezes  She has no rales  Abdominal: Soft  She exhibits no distension and no mass  There is no tenderness  Musculoskeletal: Normal range of motion  She exhibits no edema or deformity  Left lower extremity is externally rotated, slightly shortened    There is ecchymosis to the hip and the knee   Neurological: She is alert  No cranial nerve deficit  She exhibits normal muscle tone  No focal deficits   Skin: Skin is warm and dry  No rash noted  No pallor         ED Medications  Medications - No data to display    Diagnostic Studies  Results Reviewed     Procedure Component Value Units Date/Time    Comprehensive metabolic panel [[id number]]  (Abnormal) Collected:  03/15/20 1323    Lab Status:  Final result Specimen:  Blood from Arm, Left Updated:  03/15/20 1350     Sodium 146 mmol/L      Potassium 3 8 mmol/L      Chloride 112 mmol/L      CO2 30 mmol/L      ANION GAP 4 mmol/L      BUN 39 mg/dL      Creatinine 0 73 mg/dL      Glucose 97 mg/dL      Calcium 9 1 mg/dL      AST 37 U/L      ALT 20 U/L      Alkaline Phosphatase 74 U/L      Total Protein 6 4 g/dL      Albumin 2 8 g/dL      Total Bilirubin 0 59 mg/dL      eGFR 73 ml/min/1 73sq m     Narrative:       [place] guidelines for Chronic Kidney Disease (CKD):     Stage 1 with normal or high GFR (GFR > 90 mL/min/1 73 square meters)    Stage 2 Mild CKD (GFR = 60-89 mL/min/1 73 square meters)    Stage 3A Moderate CKD (GFR = 45-59 mL/min/1 73 square meters)    Stage 3B Moderate CKD (GFR = 30-44 mL/min/1 73 square meters)    Stage 4 Severe CKD (GFR = 15-29 mL/min/1 73 square meters)    Stage 5 End Stage CKD (GFR <15 mL/min/1 73 square meters)  Note: GFR calculation is accurate only with a steady state creatinine    CBC and differential [[id number]]  (Abnormal) Collected:  03/15/20 1323    Lab Status:  Final result Specimen:  Blood from Arm, Left Updated:  03/15/20 1332     WBC 7 64 Thousand/uL      RBC 3 60 Million/uL      Hemoglobin 11 6 g/dL      Hematocrit 36 1 %       fL      MCH 32 2 pg      MCHC 32 1 g/dL      RDW 13 2 %      MPV 9 9 fL      Platelets 650 Thousands/uL      nRBC 0 /100 WBCs      Neutrophils Relative 62 %      Immat GRANS % 0 %      Lymphocytes Relative 23 %      Monocytes Relative 9 % Eosinophils Relative 5 %      Basophils Relative 1 %      Neutrophils Absolute 4 76 Thousands/µL      Immature Grans Absolute 0 03 Thousand/uL      Lymphocytes Absolute 1 72 Thousands/µL      Monocytes Absolute 0 69 Thousand/µL      Eosinophils Absolute 0 38 Thousand/µL      Basophils Absolute 0 06 Thousands/µL                  XR hip/pelv 2-3 vws left   ED Interpretation by Liseth Yanez MD (03/15 1449)   Comminuted intertrochanteric fracture  Final Result by Swetha Cannon MD (03/16 2115)      Left intertrochanteric hip fracture  Workstation performed: [id number]               Procedures  Procedures      ED Course           Identification of Seniors at Risk      Most Recent Value   (ISAR) Identification of Seniors at Risk   Before the illness or injury that brought you to the Emergency, did you need someone to help you on a regular basis? 1 Filed at: 03/15/2020 1231   In the last 24 hours, have you needed more help than usual?  0 Filed at: 03/15/2020 1231   Have you been hospitalized for one or more nights during the past 6 months?  --   In general, do you see well?  --   In general, do you have serious problems with your memory? 1 Filed at: 03/15/2020 1231   Do you take more than three different medications every day?   1 Filed at: 03/15/2020 1231   ISAR Score  3 Filed at: 03/15/2020 1231                            MDM      Disposition  Final diagnoses:   Closed comminuted intertrochanteric fracture of left femur, initial encounter [facility])     Time reflects when diagnosis was documented in both MDM as applicable and the Disposition within this note     Time User Action Codes Description Comment    3/15/2020  4:08 PM Destiny Gandhi [[id number]] Closed comminuted intertrochanteric fracture of left femur, initial encounter [facility] )     3/16/2020  9:10 AM Zandra Kenyon [[id number]] S/p left hip fracture     3/16/2020  9:10 AM Vernell Gandhi [[id number]] Palliative care patient       ED Disposition ED Disposition Condition Date/Time Comment    Admit Stable Sun Mar 15, 2020  3:58 PM Case was discussed with ROSAMARIA and the patient's admission status was agreed to be Admission Status: observation status to the service of Dr Juan Pablo Johnson MD Documentation      Most Recent Value   Accepting Facility Name, [facility] and [address])  [address] Name, [address] [facility] and [address])  Moj[facility] Powell   Transfer Date  03/16/20   Transfer Time  1300      Follow-up Information    None         Discharge Medication List as of 3/16/2020 11:46 AM      CONTINUE these medications which have CHANGED    Details   ALPRAZolam (XANAX) 0 25 mg tablet Take 1 tablet (0 25 mg total) by mouth daily for 3 days, Starting Mon 3/16/2020, Until Thu 3/19/2020, Print         CONTINUE these medications which have NOT CHANGED    Details   !! acetaminophen (TYLENOL) 500 mg tablet Take 500 mg by mouth 3 (three) times a day, Historical Med      !! acetaminophen (TYLENOL) 500 mg tablet Take 500 mg by mouth every 6 (six) hours as needed for mild pain, Historical Med      Calcium Carb-Cholecalciferol 500-400 MG-UNIT TABS Take 1 tablet by mouth daily, Historical Med      levothyroxine 100 mcg tablet Take 88 mcg by mouth daily , Historical Med      Meclizine HCl (MEDI-MECLIZINE PO) Take 25 mg by mouth every 6 (six) hours as needed, Historical Med      Menthol, Topical Analgesic, (BIOFREEZE) 4 % GEL Apply topically daily, Historical Med      metoprolol tartrate (LOPRESSOR) 50 mg tablet Take 50 mg by mouth daily, Historical Med      Multiple Vitamin (MULTIVITAMIN) tablet Take 1 tablet by mouth daily, Historical Med      Nutritional Supplements (ENSURE) Take 1 Can by mouth daily, Historical Med      senna-docusate sodium (SENOKOT S) 8 6-50 mg per tablet Take 1 tablet by mouth daily, Historical Med sertraline (ZOLOFT) 25 mg tablet Take 25 mg by mouth daily, Historical Med       !! - Potential duplicate medications found  Please discuss with provider  STOP taking these medications       aspirin (ECOTRIN LOW STRENGTH) 81 mg EC tablet Comments:   Reason for Stopping:         aspirin 81 mg chewable tablet Comments:   Reason for Stopping:         aspirin 81 mg chewable tablet Comments:   Reason for Stopping:         Calcium 600-400 MG-UNIT CHEW Comments:   Reason for Stopping:         calcium carbonate (TUMS) 500 mg chewable tablet Comments:   Reason for Stopping:         calcium carbonate (TUMS) 500 mg chewable tablet Comments:   Reason for Stopping:         Calcium Carbonate-Vitamin D 600-400 MG-UNIT per tablet Comments:   Reason for Stopping:         Calcium-Vitamin D ([facility] CALCIUM-600/VIT D PO) Comments:   Reason for Stopping:         dimenhyDRINATE (DRAMAMINE) 50 mg tablet Comments:   Reason for Stopping:             Outpatient Discharge Orders   Discharge Diet     GA Pathway:  Medications to avoid: phosphate or magnesium based laxatives, NSAIDs     GA Pathway: Maintain SBP between 120-140 mm/Hg     GA Pathway: Call Nursing Home MD for decreased oral intake     GA Pathway: Daily Weights     GA Pathway: Strict I&O     GA Pathway: Follow up bloodwork     Discharge Condtion:  Declining     Patient Aware of Diagnosis: No     Free of Communicable Disease:   Yes     Weight Bearing Status       PDMP Review     None           ED Provider  Attending physically available and evaluated Fahad Delaney I managed the patient along with the ED Attending      Electronically Signed by         Vipul Boggs MD  03/17/20 2533

## 2020-03-15 NOTE — H&P
H&P- Dorene Morel 7/25/1930, 80 y o  female MRN: [id number]    Unit/Bed#: [facility] 666-63 Encounter: [id number]    Primary Care Provider: BRENDA Andrea   Date and time admitted to hospital: 3/15/2020 12:23 PM        * S/p left hip fracture  Assessment & Plan  Patient with recent fall suffering hip fracture undetected until today  Family desires no surgical intervention  Wants hospice at current residence , which will be available in the am   For tonight comfort care with CM to assist transition to hospice in the am   · Pain management currently comfortable   Scheduled tylenol and PRN IV Morphine  Depression  Assessment & Plan  Continue zoloft to prevent abrupt withdraw  Hypothyroidism  Assessment & Plan  Continue synthroid for now  Dementia [facility])  Assessment & Plan  Patient with advanced dementia, falling a lot current comfort care  · Fall precautions    Palliative care patient  Assessment & Plan  Will discontinue none comfort oriented medications  Hospice is already working with the family   CM to assist in the am     VTE Prophylaxis: Comfort measures none ordered  / sequential compression device   Code Status:  Comfort measures  POLST: POLST form is not discussed and not completed at this time  Discussion with family:  Daughters at bedside, POA    Anticipated Length of Stay:  Patient will be admitted on an Observation basis with an anticipated length of stay of  less than 2 midnights  Justification for Hospital Stay:  Patient needs care between current diagnosis and hospice placement in a [facility] Total Time for Visit, including Counseling / Coordination of Care: 1 hour  Greater than 50% of this total time spent on direct patient counseling and coordination of care  Chief Complaint:   Hip fracture left-sided    History of Present Illness:    Dorene Morel is a 80 y o  female who presents with left-sided leg pain  Patient evidently had a fall recently and was complaining of knee pain  She was evaluated by x-ray for left knee pain subsequently was not determined to have any difficulty with the knee and was returned her  facility  She continued to complain of pain and subsequently had a left hip x-ray completed which showed a fracture  Her daughter was called today with the finding and she was sent to the emergency room for further care  The family elected to not engage orthopedics for surgical intervention and would like hospice care  They have already started the ball rolling with the hospice at her facility  She will need to be monitored and pain managed overnight until she can be transition to hospice care at her home  Review of Systems:    Review of Systems    Past Medical and Surgical History:     Past Medical History:   Diagnosis Date    Anxiety     Benign positional vertigo     [facility]     Dr Marci CHERRY Chronic venous insufficiency     Depression     Hip fracture ([facility] ) 2013    HTN (hypertension), benign     Hypertension     Hypothyroidism     Left knee DJD     Malignant neoplasm of right breast ([facility] )     Nontoxic uninodular goiter     Right knee DJD        Past Surgical History:   Procedure Laterality Date    INTERTROCHANTERIC HIP FRACTURE SURGERY  2013       Meds/Allergies:    Prior to Admission medications    Medication Sig Start Date End Date Taking? Authorizing Provider   acetaminophen (TYLENOL) 500 mg tablet Take 500 mg by mouth 3 (three) times a day    Historical Provider, MD   acetaminophen (TYLENOL) 500 mg tablet Take 500 mg by mouth every 6 (six) hours as needed for mild pain    Historical Provider, MD   ALPRAZolam (XANAX) 0 25 mg tablet TAKE 1 TABLET BY MOUTH DAILY    Patient taking differently: 2 (two) times a day  2/13/19   MD ED MurryZohayder Franco) 0 25 mg tablet Take 0 25 mg by mouth daily    Historical ProviderMD Bobby) 0 25 mg tablet Take 0 25 mg by mouth daily as needed for anxiety Historical Provider, MD   ALPRAZolam Layvonne Ala) 0 25 mg tablet Take 0 25 mg by mouth daily    Historical Provider, MD   aspirin (ECOTRIN LOW STRENGTH) 81 mg EC tablet Take 81 mg by mouth daily    Historical Provider, MD   aspirin 81 mg chewable tablet Chew 81 mg daily    Historical Provider, MD   aspirin 81 mg chewable tablet Chew 81 mg daily    Historical Provider, MD   Calcium 600-400 MG-UNIT CHEW Chew    Historical Provider, MD   Calcium Carb-Cholecalciferol 500-400 MG-UNIT TABS Take 1 tablet by mouth daily    Historical Provider, MD   calcium carbonate (TUMS) 500 mg chewable tablet Chew 1 tablet (500 mg total) every 4 (four) hours as needed for indigestion or heartburn 10/16/18   BRENDA Mcfarlane   calcium carbonate (TUMS) 500 mg chewable tablet Chew 1 tablet every 4 (four) hours as needed for indigestion or heartburn    Historical Provider, MD   Calcium Carbonate-Vitamin D 600-400 MG-UNIT per tablet TAKE 1 TABLET BY MOUTH ONCE DAILY   11/27/18   BRENDA Davis   Calcium-Vitamin D ([facility] CALCIUM-600/VIT D PO) Take 1 tablet by mouth daily    Historical Provider, MD   dimenhyDRINATE (DRAMAMINE) 50 mg tablet Take 0 5 tablets (25 mg total) by mouth every 6 (six) hours as needed for movement disorders (dizziness) 10/16/18   BRENDA Davis   levothyroxine 100 mcg tablet Take 88 mcg by mouth daily     Historical Provider, MD   levothyroxine 100 mcg tablet Take 137 mcg by mouth daily    Historical Provider, MD   levothyroxine 125 mcg tablet Take 125 mcg by mouth daily    Historical Provider, MD   MAPAP 500 MG tablet TAKE 1 TABLET BY MOUTH EVERY 6 HOURS AS NEEDED 1/10/19   BRENDA Davis   meclizine (ANTIVERT) 25 mg tablet Take 25 mg by mouth 3 (three) times a day as needed for dizziness    Historical Provider, MD   Meclizine HCl (MEDI-MECLIZINE PO) Take 25 mg by mouth every 6 (six) hours as needed    Historical Provider, MD   Menthol, Topical Analgesic, (BIOFREEZE) 4 % GEL Apply topically daily Historical Provider, MD   metoprolol tartrate (LOPRESSOR) 50 mg tablet Take 25 mg by mouth daily    Historical Provider, MD   metoprolol tartrate (LOPRESSOR) 50 mg tablet Take 50 mg by mouth daily    Historical Provider, MD   metoprolol tartrate (LOPRESSOR) 50 mg tablet Take 50 mg by mouth every 12 (twelve) hours    Historical Provider, MD   Multiple Vitamin (MULTIVITAMIN) tablet Take 1 tablet by mouth daily    Historical Provider, MD   multivitamin (THERAGRAN) TABS TAKE 1 TABLET BY MOUTH DAILY  11/27/18   BRENDA Gray   multivitamin (THERAGRAN) TABS Take 1 tablet by mouth daily    Historical Provider, MD   Nutritional Supplements (ENSURE) Take 1 Can by mouth daily    Historical Provider, MD   senna-docusate sodium (SENOKOT S) 8 6-50 mg per tablet Take 1 tablet by mouth daily    Historical Provider, MD   senna-docusate sodium (SENOKOT-S) 8 6-50 mg per tablet Take 1 tablet by mouth daily as needed for constipation 10/16/18   BRENDA Mcfarlane   Sennosides-Docusate Sodium (SENNALAX-S PO) Take 1 tablet by mouth daily as needed    Historical Provider, MD   sertraline (ZOLOFT) 100 mg tablet Take 50 mg by mouth daily    Historical Provider, MD   sertraline (ZOLOFT) 25 mg tablet Take 25 mg by mouth daily    Historical Provider, MD     I have reviewed home medications with patient personally  Allergies: Allergies   Allergen Reactions    Sulindac      Unknown reaction and severity  Documented with note from previous PCP    Sulindac        Social History:     Marital Status:     Occupation:  Patient worked as an  in her younger years  Patient Pre-hospital Living Situation:  She lives at a skilled nursing facility  Patient Pre-hospital Level of Mobility:  She had been able to ambulate up into this time but had frequent falling  Patient Pre-hospital Diet Restrictions:  None  Substance Use History:   Social History     Substance and Sexual Activity   Alcohol Use Not Currently     Social History     Tobacco Use   Smoking Status Never Smoker   Smokeless Tobacco Never Used     Social History     Substance and Sexual Activity   Drug Use Not Currently       Family History:    History reviewed  No pertinent family history  Physical Exam:     Vitals:   Blood Pressure: 156/76 (03/15/20 1631)  Pulse: 68 (03/15/20 1631)  Temperature: (!) 96 4 °F (35 8 °C) (03/15/20 1631)  Temp Source: Oral (03/15/20 1232)  Respirations: 18 (03/15/20 1631)  SpO2: 98 % (03/15/20 1631)    Physical Exam    Generally thin elderly female no acute distress pleasantly confused speech is very quiet she is normocephalic atraumatic pupils equal round and reactive to light extraocular muscles intact mucous membranes are moist neck is supple there is no JVD no lymph nodes no carotid bruits chest is decreased but clear to auscultation is no rhonchi rales or wheezes  Cardiovascular regular rate rhythm positive S1-S2 no S3-S4 murmur or gallop  Abdomen is soft nontender nondistended with positive bowel sounds no hepatosplenomegaly no guarding or rebound  Extremities left leg is shortened and rotated  Neurologically patient is awake alert and able to give us history from way back but is demented and is unable to give a history now she is impulsive and does not understand that she needs to stay in bed    Additional Data:     Lab Results: I have personally reviewed pertinent reports        Results from last 7 days   Lab Units 03/15/20  1323   WBC Thousand/uL 7 64   HEMOGLOBIN g/dL 11 6   HEMATOCRIT % 36 1   PLATELETS Thousands/uL 315   NEUTROS PCT % 62   LYMPHS PCT % 23   MONOS PCT % 9   EOS PCT % 5     Results from last 7 days   Lab Units 03/15/20  1323   SODIUM mmol/L 146*   POTASSIUM mmol/L 3 8   CHLORIDE mmol/L 112*   CO2 mmol/L 30   BUN mg/dL 39*   CREATININE mg/dL 0 73   ANION GAP mmol/L 4   CALCIUM mg/dL 9 1   ALBUMIN g/dL 2 8*   TOTAL BILIRUBIN mg/dL 0 59   ALK PHOS U/L 74   ALT U/L 20   AST U/L 37   GLUCOSE RANDOM mg/dL 97 Imaging: I have personally reviewed pertinent reports  XR hip/pelv 2-3 vws left   ED Interpretation by Venu Canales MD (03/15 3052)   Comminuted intertrochanteric fracture  EKG, Pathology, and Other Studies Reviewed on Admission:   · EKG:  None    Allscripts / Epic Records Reviewed: Yes     ** Please Note: This note has been constructed using a voice recognition system   **

## 2020-03-16 PROCEDURE — 99217 PR OBSERVATION CARE DISCHARGE MANAGEMENT: CPT | Performed by: NURSE PRACTITIONER

## 2020-03-16 PROCEDURE — 99220 PR INITIAL OBSERVATION CARE/DAY 70 MINUTES: CPT | Performed by: INTERNAL MEDICINE

## 2020-03-16 RX ORDER — ALPRAZOLAM 0.25 MG/1
0.25 TABLET ORAL DAILY
Qty: 3 TABLET | Refills: 0 | Status: SHIPPED | OUTPATIENT
Start: 2020-03-16 | End: 2020-03-19

## 2020-03-16 RX ADMIN — ALPRAZOLAM 0.25 MG: 0.25 TABLET ORAL at 09:44

## 2020-03-16 RX ADMIN — LEVOTHYROXINE SODIUM 88 MCG: 88 TABLET ORAL at 06:50

## 2020-03-16 RX ADMIN — MORPHINE SULFATE 1 MG: 2 INJECTION, SOLUTION INTRAMUSCULAR; INTRAVENOUS at 09:45

## 2020-03-16 NOTE — DISCHARGE INSTRUCTIONS
Hospice Care   WHAT YOU NEED TO KNOW:   What is hospice care? Hospice care focuses on relieving your symptoms and improving the quality of the last months of your life  Hospice care will be specific to your needs and the needs of your family  Care can be provided at home or in a hospital  It can also be provided in a specialized hospice facility or long-term care facility  Who provides hospice care? Hospice care is provided by a team trained in support and education related to death and dying  This team includes doctors, nurses, and social workers  It may also include home health aides, clergy, therapists, and trained volunteers  A team member will be available any time day or night to answer questions or help with problems  The hospice doctor and your healthcare provider will work together to manage your treatment  What do hospice care services include? · Treatment management  helps ease your symptoms, such as pain  This may be done using medicines or certain therapies  Equipment, a bed, or other medical supplies may be provided to help care for you  · Emotional and psychological care  is provided to help you, your family, and those close to you cope with their feelings  Regular meetings will be held to discuss your condition, your needs, and the needs of your loved ones  You and your family may join support groups or meet others in similar situations  · Respite care  provides your family and healthcare providers up to 5 days to rest from providing care  During this time, you will be cared for in a hospice facility, hospital, or long-term care facility  · Practical support  assists you and your family with concerns such as legal issues and  arrangements  Your  can help you find services that fit your needs and your family's needs  · Spiritual and cultural support  helps you and your family evaluate [employer] values and cultural beliefs   Thinking about values and beliefs may make it easier to understand and accept your condition  · Loss support  is provided to the family for about 1 year after death  The hospice care team will help your family through the process of grieving  Your loved ones will receive visits and phone calls, and may be referred to support groups  CARE AGREEMENT:   You have the right to help plan your care  Learn about your health condition and how it may be treated  Discuss treatment options with your caregivers to decide what care you want to receive  You always have the right to refuse treatment  The above information is an  only  It is not intended as medical advice for individual conditions or treatments  Talk to your doctor, nurse or pharmacist before following any medical regimen to see if it is safe and effective for you  © 2017 [address] Information is for End User's use only and may not be sold, redistributed or otherwise used for commercial purposes  All illustrations and images included in CareNotes® are the copyrighted property of [employer]  or [address]

## 2020-03-16 NOTE — SOCIAL WORK
Patient is arranged to return to [facility] today at 1pm with [facility]  RN at bedside, facility and provider made aware or discharge plans  [facility] will be meeting with patient today  According to last CM note, CM is to call Angela Kuo to let them know which bldg and room #  [facility] C40, CM relayed message to Kyara Hollingsworth at Angela [facility]o  Left  for patient's dgt Suad with pickup time

## 2020-03-16 NOTE — DISCHARGE SUMMARY
Discharge Summary - [facility]    Patient Information: Angie Dias 80 y o  female MRN: [id number]  Unit/Bed#: [facility] 197-34 Encounter: [id number]    Discharging Physician / Practitioner: BRENDA Elaine  PCP: BRENDA Alvarez  Admission Date: 3/15/2020  Discharge Date: 03/16/20    Disposition:     Other: pending final discharge under hospice care    Reason for Admission: s/p fall with hip fracture     Discharge Diagnoses:     Principal Problem:    S/p left hip fracture  Active Problems:    Palliative care patient    Dementia [facility])    Hypothyroidism    Depression    Closed comminuted intertrochanteric fracture of proximal end of left femur ([address] )  Resolved Problems:    * No resolved hospital problems  *      Consultations During Hospital Stay:  · None     Procedures Performed:     · mrsa swab in process   · Ct cervical spine 3/6/20: 1   No acute fracture or subluxation in the cervical spine is appreciated  2   Incidental thyroid nodule(s) for which nonemergent thyroid ultrasound is recommended  · Ct head wo contrast: Stable exam with changes of atrophy and chronic microangiopathic disease without acute intracranial abnormality appreciated  · Left knee xray: No acute osseous abnormality  Moderate to severe degenerative changes most severe in the medial joint compartment  · Left hip/ pelvis xray: Left intertrochanteric hip fracture  Significant Findings / Test Results:     · See above     Incidental Findings:   2   Incidental thyroid nodule(s) for which nonemergent thyroid ultrasound is recommended  Test Results Pending at Discharge (will require follow up):     none     Outpatient Tests Requested:  · none    Complications:  No complications     Hospital Course:     Angie Dias is a 80 y o  female patient who originally presented to the hospital on 3/15/2020 due to left sided hip fracture  Patient presented to the emergency room with left-sided leg pain    Evidently had fallen recently complaining of knee pain  She was evaluated by x-ray for left knee pain subsequently was not determined to have had any difficulty with anemia was return to the facility  She continued to complain of pain and subsequently had a left hip x-ray completed which demonstrated a fracture  Her daughter was called today with the findings and she was sent to the emergency room for further care  The family after discussion with the admitting attending and ER elected to not engage orthopedics for surgical intervention and would like to have hospice care  They have already started the ball rolling with hospice if there at her facility she will need to be monitored with pain management overnight until she can be transitioned to hospice care at [facility] care  Pt will be discharged to facility with hospice care  Patient was admitted for leg pain has had no respiratory symptoms  No fever/noted chills  Wbc count is 7 64  No cough or infectious signs or symptoms  Condition at Discharge: poor     Discharge Day Visit / Exam  Subjective:  Pt is very confused appears comfortable no complaints of pain or cannot articulate but flacc at zero  Vitals: Blood Pressure: 156/76 (03/15/20 1631)  Pulse: 68 (03/15/20 1631)  Temperature: (!) 96 4 °F (35 8 °C) (03/15/20 1631)  Temp Source: Oral (03/15/20 1232)  Respirations: 18 (03/15/20 1631)  SpO2: 98 % (03/15/20 1631)  Exam:   Physical Exam   Constitutional: She is oriented to person, place, and time  No distress  HENT:   Head: Normocephalic and atraumatic  Mouth/Throat: No oropharyngeal exudate  Eyes: Conjunctivae are normal  Right eye exhibits no discharge  Left eye exhibits no discharge  No scleral icterus  Neck: No tracheal deviation present  No thyromegaly present  Cardiovascular: Exam reveals no gallop and no friction rub  Murmur heard  Pulmonary/Chest: No stridor  No respiratory distress  She has no wheezes  She has no rales   She

## 2020-03-16 NOTE — PLAN OF CARE
Problem: Potential for Falls  Goal: Patient will remain free of falls  Description  INTERVENTIONS:  - Assess patient frequently for physical needs  -  Identify cognitive and physical deficits and behaviors that affect risk of falls    -  [place] fall precautions as indicated by assessment   - Educate patient/family on patient safety including physical limitations  - Instruct patient to call for assistance with activity based on assessment  - Modify environment to reduce risk of injury  - Consider OT/PT consult to assist with strengthening/mobility  Outcome: Adequate for Discharge     Problem: Prexisting or High Potential for Compromised Skin Integrity  Goal: Skin integrity is maintained or improved  Description  INTERVENTIONS:  - Identify patients at risk for skin breakdown  - Assess and monitor skin integrity  - Assess and monitor nutrition and hydration status  - Monitor labs   - Assess for incontinence   - Turn and reposition patient  - Assist with mobility/ambulation  - Relieve pressure over bony prominences  - Avoid friction and shearing  - Provide appropriate hygiene as needed including keeping skin clean and dry  - Evaluate need for skin moisturizer/barrier cream  - Collaborate with interdisciplinary team   - Patient/family teaching  - Consider wound care consult   Outcome: Adequate for Discharge     Problem: PAIN - ADULT  Goal: Verbalizes/displays adequate comfort level or baseline comfort level  Description  Interventions:  - Encourage patient to monitor pain and request assistance  - Assess pain using appropriate pain scale  - Administer analgesics based on type and severity of pain and evaluate response  - Implement non-pharmacological measures as appropriate and evaluate response  - Consider cultural and social influences on pain and pain management  - Notify physician/advanced practitioner if interventions unsuccessful or patient reports new pain  Outcome: Adequate for Discharge     Problem: SAFETY ADULT  Goal: Maintain or return to baseline ADL function  Description  INTERVENTIONS:  -  Assess patient's ability to carry out ADLs; assess patient's baseline for ADL function and identify physical deficits which impact ability to perform ADLs (bathing, care of mouth/teeth, toileting, grooming, dressing, etc )  - Assess/evaluate cause of self-care deficits   - Assess range of motion  - Assess patient's mobility; develop plan if impaired  - Assess patient's need for assistive devices and provide as appropriate  - Encourage maximum independence but intervene and supervise when necessary  - Involve family in performance of ADLs  - Assess for home care needs following discharge   - Consider OT consult to assist with ADL evaluation and planning for discharge  - Provide patient education as appropriate  Outcome: Adequate for Discharge  Goal: Maintain or return mobility status to optimal level  Description  INTERVENTIONS:  - Assess patient's baseline mobility status (ambulation, transfers, stairs, etc )    - Identify cognitive and physical deficits and behaviors that affect mobility  - Identify mobility aids required to assist with transfers and/or ambulation (gait belt, sit-to-stand, lift, walker, cane, etc )  - [place] fall precautions as indicated by assessment  - Record patient progress and toleration of activity level on Mobility SBAR; progress patient to next Phase/Stage  - Instruct patient to call for assistance with activity based on assessment  - Consider rehabilitation consult to assist with strengthening/weightbearing, etc   Outcome: Adequate for Discharge     Problem: DISCHARGE PLANNING  Goal: Discharge to home or other facility with appropriate resources  Description  INTERVENTIONS:  - Identify barriers to discharge w/patient and caregiver  - Arrange for needed discharge resources and transportation as appropriate  - Identify discharge learning needs (meds, wound care, etc )  - Arrange for interpretive services to assist at discharge as needed  - Refer to Case Management Department for coordinating discharge planning if the patient needs post-hospital services based on physician/advanced practitioner order or complex needs related to functional status, cognitive ability, or social support system  Outcome: Adequate for Discharge

## 2020-03-17 LAB — MRSA NOSE QL CULT: NORMAL

## 2021-05-10 NOTE — ED NOTES
CHRISTIANE transport to  pt at 0968       Sierra Flores, BRANDO  02/22/20 3311 [Initial Visit] : an initial visit for [Degenerative Joint Disease] : degenerative joint disease [Back Pain] : back pain [Radiculopathy] : radiculopathy